# Patient Record
Sex: FEMALE | Race: WHITE | NOT HISPANIC OR LATINO | Employment: OTHER | URBAN - METROPOLITAN AREA
[De-identification: names, ages, dates, MRNs, and addresses within clinical notes are randomized per-mention and may not be internally consistent; named-entity substitution may affect disease eponyms.]

---

## 2023-03-31 ENCOUNTER — NURSING HOME VISIT (OUTPATIENT)
Dept: POST ACUTE CARE | Facility: EXTERNAL LOCATION | Age: 88
End: 2023-03-31
Payer: MEDICARE

## 2023-03-31 VITALS
OXYGEN SATURATION: 94 % | WEIGHT: 152.8 LBS | DIASTOLIC BLOOD PRESSURE: 68 MMHG | BODY MASS INDEX: 28.87 KG/M2 | TEMPERATURE: 97.8 F | RESPIRATION RATE: 18 BRPM | HEART RATE: 72 BPM | SYSTOLIC BLOOD PRESSURE: 112 MMHG

## 2023-03-31 DIAGNOSIS — E03.9 HYPOTHYROIDISM, UNSPECIFIED TYPE: ICD-10-CM

## 2023-03-31 DIAGNOSIS — E11.40 TYPE 2 DIABETES MELLITUS WITH DIABETIC NEUROPATHY, UNSPECIFIED WHETHER LONG TERM INSULIN USE (MULTI): ICD-10-CM

## 2023-03-31 DIAGNOSIS — F32.A DEPRESSION, UNSPECIFIED DEPRESSION TYPE: ICD-10-CM

## 2023-03-31 DIAGNOSIS — N39.0 CHRONIC UTI: ICD-10-CM

## 2023-03-31 DIAGNOSIS — M79.7 FIBROMYALGIA: ICD-10-CM

## 2023-03-31 DIAGNOSIS — G89.29 OTHER CHRONIC PAIN: Primary | ICD-10-CM

## 2023-03-31 DIAGNOSIS — G25.0 BENIGN ESSENTIAL TREMOR: ICD-10-CM

## 2023-03-31 PROBLEM — G25.81 RESTLESS LEG SYNDROME: Status: ACTIVE | Noted: 2023-03-31

## 2023-03-31 PROBLEM — N31.9 NEUROGENIC BLADDER: Status: ACTIVE | Noted: 2023-03-31

## 2023-03-31 PROBLEM — F09 COGNITIVE DYSFUNCTION: Status: ACTIVE | Noted: 2023-03-31

## 2023-03-31 PROBLEM — E11.9 DM2 (DIABETES MELLITUS, TYPE 2) (MULTI): Status: ACTIVE | Noted: 2023-03-31

## 2023-03-31 PROBLEM — S72.90XA FRACTURE, FEMUR (MULTI): Status: RESOLVED | Noted: 2023-03-31 | Resolved: 2023-03-31

## 2023-03-31 PROBLEM — E55.9 VITAMIN D DEFICIENCY: Status: ACTIVE | Noted: 2023-03-31

## 2023-03-31 PROBLEM — H54.40 BLINDNESS OF LEFT EYE: Status: ACTIVE | Noted: 2023-03-31

## 2023-03-31 PROBLEM — R26.89 POOR BALANCE: Status: ACTIVE | Noted: 2023-03-31

## 2023-03-31 PROBLEM — K57.30 DIVERTICULOSIS OF COLON: Status: RESOLVED | Noted: 2023-03-31 | Resolved: 2023-03-31

## 2023-03-31 PROBLEM — G25.9 FUNCTIONAL MOVEMENT DISORDER: Status: ACTIVE | Noted: 2023-03-31

## 2023-03-31 PROBLEM — I63.9 STROKE (MULTI): Status: RESOLVED | Noted: 2023-03-31 | Resolved: 2023-03-31

## 2023-03-31 PROCEDURE — 99310 SBSQ NF CARE HIGH MDM 45: CPT | Performed by: NURSE PRACTITIONER

## 2023-03-31 RX ORDER — BRIMONIDINE TARTRATE 2 MG/ML
1 SOLUTION/ DROPS OPHTHALMIC 2 TIMES DAILY
COMMUNITY
End: 2023-10-17 | Stop reason: WASHOUT

## 2023-03-31 RX ORDER — METFORMIN HYDROCHLORIDE 500 MG/1
500 TABLET ORAL
COMMUNITY

## 2023-03-31 RX ORDER — ESCITALOPRAM OXALATE 20 MG/1
1 TABLET ORAL DAILY
COMMUNITY
Start: 2016-04-05 | End: 2023-10-17 | Stop reason: WASHOUT

## 2023-03-31 RX ORDER — ADHESIVE BANDAGE
30 BANDAGE TOPICAL
COMMUNITY
End: 2023-10-17 | Stop reason: WASHOUT

## 2023-03-31 RX ORDER — MIRABEGRON 50 MG/1
1 TABLET, EXTENDED RELEASE ORAL DAILY
COMMUNITY

## 2023-03-31 RX ORDER — ACETAMINOPHEN 500 MG
50 TABLET ORAL DAILY
COMMUNITY
Start: 2021-05-03

## 2023-03-31 RX ORDER — CHOLECALCIFEROL (VITAMIN D3) 50 MCG
50 TABLET ORAL DAILY
COMMUNITY
End: 2023-10-17 | Stop reason: WASHOUT

## 2023-03-31 RX ORDER — LEVOTHYROXINE SODIUM 75 UG/1
1 TABLET ORAL DAILY
COMMUNITY
Start: 2014-07-17

## 2023-03-31 RX ORDER — NAPROXEN SODIUM 220 MG/1
81 TABLET, FILM COATED ORAL DAILY
COMMUNITY

## 2023-03-31 RX ORDER — ACETAMINOPHEN 325 MG/1
650 TABLET ORAL EVERY 4 HOURS PRN
COMMUNITY
End: 2023-10-17 | Stop reason: WASHOUT

## 2023-03-31 RX ORDER — BIOTIN 1 MG
1 TABLET ORAL DAILY
COMMUNITY
End: 2023-10-17 | Stop reason: WASHOUT

## 2023-03-31 RX ORDER — OXYCODONE AND ACETAMINOPHEN 7.5; 325 MG/1; MG/1
1 TABLET ORAL 2 TIMES DAILY
COMMUNITY
Start: 2022-03-18 | End: 2023-10-17 | Stop reason: WASHOUT

## 2023-03-31 RX ORDER — OXYCODONE AND ACETAMINOPHEN 5; 325 MG/1; MG/1
1 TABLET ORAL EVERY 6 HOURS PRN
COMMUNITY
End: 2023-10-17 | Stop reason: WASHOUT

## 2023-03-31 RX ORDER — BISACODYL 10 MG/1
10 SUPPOSITORY RECTAL ONCE AS NEEDED
COMMUNITY
End: 2023-10-17 | Stop reason: WASHOUT

## 2023-03-31 ASSESSMENT — ENCOUNTER SYMPTOMS
PSYCHIATRIC NEGATIVE: 1
RESPIRATORY NEGATIVE: 1
MUSCULOSKELETAL NEGATIVE: 1
EYES NEGATIVE: 1
TREMORS: 1
GASTROINTESTINAL NEGATIVE: 1
ALLERGIC/IMMUNOLOGIC NEGATIVE: 1
CONSTITUTIONAL NEGATIVE: 1
CARDIOVASCULAR NEGATIVE: 1
ENDOCRINE NEGATIVE: 1
HEMATOLOGIC/LYMPHATIC NEGATIVE: 1

## 2023-03-31 NOTE — LETTER
Patient: Maria Luz Martinez  : 1935    Encounter Date: 2023    MRN:[id number]     Subjective  Patient ID: Maria Luz Martinez is a 87 y.o. female who presents for chronic UTI, Diabetes, Tremors, and Follow up labs.  This is an 86 year old female who is a [facility] resident. Mrs. 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 Resident follows with neurologist (Dr. Ahuja) and pain mgmt () for a previously planned hip replacement that was put on hold last year due to COVID. Mrs. Martinez goes for steroid injections to her hip every 6 months. Resident is following with Dr. Tafoya for tremors and is currently on primidone, tolerating well.  She had a fall 2022 and suffered displaced spiral fracture of the mid to distal diaphysis of the right femur s/p right femur ORIF. She continues on chronic percocet for pain control.     Resident seen today for several concerns that her dtr had communicated via email.  Daughter asking about the chronic reoccurrence of UTI's her mother continues to have and we had discussed a prophylaxis in thepast. Dtr also asking if frequency of blood sugars could be limited to 2-3 tines/week, as well as the possibility of decreasing her ropinerole dose back to 3mg every day which was recommended by the neurologist. Dtr also askinng about recent tsh levels and nursing reports resident needs med refill on pain meds.     Diabetes  Hypoglycemia symptoms include tremors.   Tremor      Review of Systems   Constitutional: Negative.    HENT: Negative.     Eyes: Negative.    Respiratory: Negative.     Cardiovascular: Negative.    Gastrointestinal: Negative.    Endocrine: Negative.    Genitourinary: Negative.    Musculoskeletal: Negative.    Skin: Negative.    Allergic/Immunologic: Negative.    Neurological:  Positive for tremors.   Hematological: Negative.    Psychiatric/Behavioral:  Negative.         Objective    /68   Pulse 72   Temp 36.6 °C (97.8 °F) (Oral)   Resp 18   Wt 69.3 kg (152 lb 12.8 oz)   SpO2 94%   BMI 28.87 kg/m²    Physical Exam  Vitals and nursing note reviewed.   Constitutional:       Appearance: Normal appearance. She is normal weight.   HENT:      Head: Normocephalic and atraumatic.   Cardiovascular:      Rate and Rhythm: Normal rate and regular rhythm.      Pulses: Normal pulses.      Heart sounds: Normal heart sounds.   Pulmonary:      Effort: Pulmonary effort is normal.      Breath sounds: Normal breath sounds.   Abdominal:      General: Abdomen is flat. Bowel sounds are normal.      Palpations: Abdomen is soft.   Musculoskeletal:         General: Normal range of motion.      Cervical back: Normal range of motion and neck supple.   Skin:     General: Skin is warm and dry.      Capillary Refill: Capillary refill takes less than 2 seconds.   Neurological:      General: No focal deficit present.      Mental Status: She is alert and oriented to person, place, and time. Mental status is at baseline.   Psychiatric:         Mood and Affect: Mood normal.         Behavior: Behavior normal.         Thought Content: Thought content normal.         Judgment: Judgment normal.     LABS:  1/4/23  GLYCO-HGBA1C RAFFY  GLYCOHEMOGLOBIN-HGBA1C 6.1 4.1-6.1 %  ~ eAG (Mean Glucose) 128 H  mg/dL  TSH 3-UL 1.259 0.340-5.600 uIU/mL RAFFY  T4, FREE 1.12 0.70-1.48 ng/dL RAFFY        Assessment/Plan  Problem List Items Addressed This Visit          Nervous    Chronic pain - Primary     -Continue current medications as ordered  -Medication refill placed         Benign essential tremor     -Dtr asking to decrease ropiunerole to 3mg (from 4mg)  -This was recommended by her neurologist at last visit to possibly cut down on chronic fatigue  -Will continue to monitor s/s  -Continue primidone            Genitourinary    Chronic UTI     -Dtr and I have discussed the possibility of a prophylaxis  atb as resident has had 4 UTI's in the last 3 months  -Will start cipro 250mg every day for prophylaxis with bacid BID once she receives her steroid injection on 4/7  -Continue to encourage fluids            Musculoskeletal    Fibromyalgia       Endocrine/Metabolic    DM2 (diabetes mellitus, type 2) (CMS/[facility])     -will change her blood sugars to 3x/week on M-W-F only  -Blood sugars have usually been well controlled unless after steroid injections  -Will continue to monitor this         Hypothyroidism     -Daughter asking about results of recent labs  -Reviewed last 2 results which have been well controlled  -Will continue to monitor [id number]             Other    Depression     -continue cymbalta as ordered  -Monitor mood  -Encourage to participate in activities             Discussed with Daughter regarding all of her concerns and questions  Total time spent : 1440pm-1545pm      Electronically Signed By: CAITY Thakur   4/2/23  6:19 PM

## 2023-03-31 NOTE — PROGRESS NOTES
MRN:[id number]     Subjective   Patient ID: Maria Luz Martinez is a 87 y.o. female who presents for chronic UTI, Diabetes, Tremors, and Follow up labs.  This is an 86 year old female who is a [facility] resident. Mrs. 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 Resident follows with neurologist (Dr. Ahuja) and pain mgmt () for a previously planned hip replacement that was put on hold last year due to COVID. Mrs. Martinez goes for steroid injections to her hip every 6 months. Resident is following with Dr. Tafoya for tremors and is currently on primidone, tolerating well.  She had a fall Feb 2022 and suffered displaced spiral fracture of the mid to distal diaphysis of the right femur s/p right femur ORIF. She continues on chronic percocet for pain control.     Resident seen today for several concerns that her dtr had communicated via email.  Daughter asking about the chronic reoccurrence of UTI's her mother continues to have and we had discussed a prophylaxis in thepast. Dtr also asking if frequency of blood sugars could be limited to 2-3 tines/week, as well as the possibility of decreasing her ropinerole dose back to 3mg every day which was recommended by the neurologist. Dtr also askinng about recent tsh levels and nursing reports resident needs med refill on pain meds.     Diabetes  Hypoglycemia symptoms include tremors.   Tremor      Review of Systems   Constitutional: Negative.    HENT: Negative.     Eyes: Negative.    Respiratory: Negative.     Cardiovascular: Negative.    Gastrointestinal: Negative.    Endocrine: Negative.    Genitourinary: Negative.    Musculoskeletal: Negative.    Skin: Negative.    Allergic/Immunologic: Negative.    Neurological:  Positive for tremors.   Hematological: Negative.    Psychiatric/Behavioral: Negative.         Objective     /68   Pulse 72   Temp 36.6 °C (97.8  °F) (Oral)   Resp 18   Wt 69.3 kg (152 lb 12.8 oz)   SpO2 94%   BMI 28.87 kg/m²    Physical Exam  Vitals and nursing note reviewed.   Constitutional:       Appearance: Normal appearance. She is normal weight.   HENT:      Head: Normocephalic and atraumatic.   Cardiovascular:      Rate and Rhythm: Normal rate and regular rhythm.      Pulses: Normal pulses.      Heart sounds: Normal heart sounds.   Pulmonary:      Effort: Pulmonary effort is normal.      Breath sounds: Normal breath sounds.   Abdominal:      General: Abdomen is flat. Bowel sounds are normal.      Palpations: Abdomen is soft.   Musculoskeletal:         General: Normal range of motion.      Cervical back: Normal range of motion and neck supple.   Skin:     General: Skin is warm and dry.      Capillary Refill: Capillary refill takes less than 2 seconds.   Neurological:      General: No focal deficit present.      Mental Status: She is alert and oriented to person, place, and time. Mental status is at baseline.   Psychiatric:         Mood and Affect: Mood normal.         Behavior: Behavior normal.         Thought Content: Thought content normal.         Judgment: Judgment normal.     LABS:  1/4/23  GLYCO-HGBA1C RAFFY  GLYCOHEMOGLOBIN-HGBA1C 6.1 4.1-6.1 %  ~ eAG (Mean Glucose) 128 H  mg/dL  TSH 3-UL 1.259 0.340-5.600 uIU/mL RAFFY  T4, FREE 1.12 0.70-1.48 ng/dL [facility]        Assessment/Plan   Problem List Items Addressed This Visit          Nervous    Chronic pain - Primary     -Continue current medications as ordered  -Medication refill placed         Benign essential tremor     -Dtr asking to decrease ropiunerole to 3mg (from 4mg)  -This was recommended by her neurologist at last visit to possibly cut down on chronic fatigue  -Will continue to monitor s/s  -Continue primidone            Genitourinary    Chronic UTI     -Dtr and I have discussed the possibility of a prophylaxis atb as resident has had 4 UTI's in the last 3 months  -Will start cipro  250mg every day for prophylaxis with bacid BID once she receives her steroid injection on 4/7  -Continue to encourage fluids            Musculoskeletal    Fibromyalgia       Endocrine/Metabolic    DM2 (diabetes mellitus, type 2) (CMS/HCC)     -will change her blood sugars to 3x/week on M-W-F only  -Blood sugars have usually been well controlled unless after steroid injections  -Will continue to monitor this         Hypothyroidism     -Daughter asking about results of recent labs  -Reviewed last 2 results which have been well controlled  -Will continue to monitor [id number]             Other    Depression     -continue cymbalta as ordered  -Monitor mood  -Encourage to participate in activities             Discussed with Daughter regarding all of her concerns and questions  Total time spent : 1440pm-1545pm

## 2023-04-02 PROBLEM — F32.A DEPRESSION: Status: ACTIVE | Noted: 2023-04-02

## 2023-04-02 PROBLEM — N39.0 CHRONIC UTI: Status: ACTIVE | Noted: 2023-04-02

## 2023-04-02 PROBLEM — G25.0 BENIGN ESSENTIAL TREMOR: Status: ACTIVE | Noted: 2023-04-02

## 2023-04-02 NOTE — ASSESSMENT & PLAN NOTE
-will change her blood sugars to 3x/week on M-W-F only  -Blood sugars have usually been well controlled unless after steroid injections  -Will continue to monitor this

## 2023-04-02 NOTE — ASSESSMENT & PLAN NOTE
-Dtr asking to decrease ropiunerole to 3mg (from 4mg)  -This was recommended by her neurologist at last visit to possibly cut down on chronic fatigue  -Will continue to monitor s/s  -Continue primidone

## 2023-04-02 NOTE — ASSESSMENT & PLAN NOTE
-Daughter asking about results of recent labs  -Reviewed last 2 results which have been well controlled  -Will continue to monitor [id number]

## 2023-04-02 NOTE — ASSESSMENT & PLAN NOTE
-Dtr and I have discussed the possibility of a prophylaxis atb as resident has had 4 UTI's in the last 3 months  -Will start cipro 250mg every day for prophylaxis with bacid BID once she receives her steroid injection on 4/7  -Continue to encourage fluids

## 2023-06-23 ENCOUNTER — NURSING HOME VISIT (OUTPATIENT)
Dept: POST ACUTE CARE | Facility: EXTERNAL LOCATION | Age: 88
End: 2023-06-23
Payer: MEDICARE

## 2023-06-23 DIAGNOSIS — G25.81 RESTLESS LEG SYNDROME: ICD-10-CM

## 2023-06-23 DIAGNOSIS — F01.518 VASCULAR DEMENTIA WITH BEHAVIORAL DISTURBANCE (MULTI): Primary | ICD-10-CM

## 2023-06-23 DIAGNOSIS — G89.4 CHRONIC PAIN SYNDROME: ICD-10-CM

## 2023-06-23 DIAGNOSIS — F32.A DEPRESSION, UNSPECIFIED DEPRESSION TYPE: ICD-10-CM

## 2023-06-23 DIAGNOSIS — G25.0 BENIGN ESSENTIAL TREMOR: ICD-10-CM

## 2023-06-23 PROCEDURE — 99309 SBSQ NF CARE MODERATE MDM 30: CPT | Performed by: NURSE PRACTITIONER

## 2023-06-23 NOTE — LETTER
Patient: Maria Luz Martinez  : 1935    Encounter Date: 2023    MRN:[id number]     Subjective   Patient ID: Maria Luz Martinez is a 87 y.o. female who presents for Depression (Resident seen for routine follow up of depression. Nursing reports, overall resident doing well. Has been coming out of her room for frequent;y to visit her [place]and in [place] and for many of the activities. Mood has been positive. Resident continues to go out to see the eye doctor for ongoing MD/glaucoma. ).  This is an 87 year old female who is a [facility] resident. Mrs. 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 Resident follows with neurologist (Dr. Ahuja) and pain mgmt () for a previously planned hip replacement that was put on hold last year due to COVID. Mrs. Martinez goes for steroid injections to her hip every 6 months. Resident is following with Dr. Tafoya for tremors and is currently on primidone, tolerating well.    DepressionPatient presents with the following symptoms: nervousness/anxiety.          Review of Systems   Constitutional: Negative.    HENT: Negative.     Eyes: Negative.    Respiratory: Negative.     Cardiovascular: Negative.    Gastrointestinal: Negative.    Endocrine: Negative.    Genitourinary: Negative.  Negative for dysuria and frequency.   Musculoskeletal: Negative.    Skin: Negative.    Neurological: Negative.    Psychiatric/Behavioral:  Positive for depression. The patient is nervous/anxious.        Objective     /69   Pulse 77   Temp 36.6 °C (97.9 °F)   Resp 18   Wt 70.7 kg (155 lb 14.4 oz)   SpO2 95%   BMI 29.46 kg/m²    Physical Exam  Vitals and nursing note reviewed.   Constitutional:       Appearance: Normal appearance.   HENT:      Head: Normocephalic.      Nose: Nose normal.      Mouth/Throat:      Mouth: Mucous membranes are moist.      Pharynx: Oropharynx is clear.   Eyes:       Conjunctiva/sclera: Conjunctivae normal.      Comments: Blind      Cardiovascular:      Rate and Rhythm: Normal rate and regular rhythm.      Pulses: Normal pulses.      Heart sounds: Normal heart sounds.   Pulmonary:      Effort: Pulmonary effort is normal. No respiratory distress.      Breath sounds: Normal breath sounds.   Abdominal:      General: Abdomen is flat. Bowel sounds are normal.      Palpations: Abdomen is soft.   Musculoskeletal:         General: Normal range of motion.      Cervical back: Normal range of motion.   Skin:     General: Skin is warm.      Capillary Refill: Capillary refill takes 2 to 3 seconds.   Neurological:      General: No focal deficit present.      Mental Status: She is alert. Mental status is at baseline.   Psychiatric:         Mood and Affect: Mood normal.       Assessment/Plan   Problem List Items Addressed This Visit       Restless leg syndrome     Continues on gabapentin as ordered  No recent complaints  Monitor for changes         Chronic pain     Resident remains on pain medications  See Pain mgmt who is aware we write for her medications  Cont to monitor for increased pain         Benign essential tremor    Depression     Continue duloxetine as ordered  Monitor nmood  Encourage to come out during the day         Vascular dementia with behavioral disturbance (CMS/HCC) - Primary     Monitor for changes in mood/personality                     Electronically Signed By: CAITY Thakur   6/29/23  8:27 PM

## 2023-06-29 VITALS
OXYGEN SATURATION: 95 % | BODY MASS INDEX: 29.46 KG/M2 | WEIGHT: 155.9 LBS | DIASTOLIC BLOOD PRESSURE: 69 MMHG | HEART RATE: 77 BPM | RESPIRATION RATE: 18 BRPM | SYSTOLIC BLOOD PRESSURE: 122 MMHG | TEMPERATURE: 97.9 F

## 2023-06-29 PROBLEM — F01.518 VASCULAR DEMENTIA WITH BEHAVIORAL DISTURBANCE (MULTI): Status: ACTIVE | Noted: 2023-06-29

## 2023-06-29 ASSESSMENT — ENCOUNTER SYMPTOMS
GASTROINTESTINAL NEGATIVE: 1
NEUROLOGICAL NEGATIVE: 1
CONSTITUTIONAL NEGATIVE: 1
FREQUENCY: 0
DYSURIA: 0
RESPIRATORY NEGATIVE: 1
NERVOUS/ANXIOUS: 1
ENDOCRINE NEGATIVE: 1
CARDIOVASCULAR NEGATIVE: 1
EYES NEGATIVE: 1
DEPRESSION: 1
MUSCULOSKELETAL NEGATIVE: 1

## 2023-06-29 NOTE — PROGRESS NOTES
MRN:[id number]     Subjective   Patient ID: Maria Luz Martinez is a 87 y.o. female who presents for Depression (Resident seen for routine follow up of depression. Nursing reports, overall resident doing well. Has been coming out of her room for frequent;y to visit her [place]and in [place] and for many of the activities. Mood has been positive. Resident continues to go out to see the eye doctor for ongoing MD/glaucoma. ).  This is an 87 year old female who is a [facility] resident. Mrs. 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 Resident follows with neurologist (Dr. Ahuja) and pain mgmt () for a previously planned hip replacement that was put on hold last year due to COVID. Mrs. Martinez goes for steroid injections to her hip every 6 months. Resident is following with Dr. Tafoya for tremors and is currently on primidone, tolerating well.    DepressionPatient presents with the following symptoms: nervousness/anxiety.          Review of Systems   Constitutional: Negative.    HENT: Negative.     Eyes: Negative.    Respiratory: Negative.     Cardiovascular: Negative.    Gastrointestinal: Negative.    Endocrine: Negative.    Genitourinary: Negative.  Negative for dysuria and frequency.   Musculoskeletal: Negative.    Skin: Negative.    Neurological: Negative.    Psychiatric/Behavioral:  Positive for depression. The patient is nervous/anxious.        Objective     /69   Pulse 77   Temp 36.6 °C (97.9 °F)   Resp 18   Wt 70.7 kg (155 lb 14.4 oz)   SpO2 95%   BMI 29.46 kg/m²    Physical Exam  Vitals and nursing note reviewed.   Constitutional:       Appearance: Normal appearance.   HENT:      Head: Normocephalic.      Nose: Nose normal.      Mouth/Throat:      Mouth: Mucous membranes are moist.      Pharynx: Oropharynx is clear.   Eyes:      Conjunctiva/sclera: Conjunctivae normal.      Comments: Blind       Cardiovascular:      Rate and Rhythm: Normal rate and regular rhythm.      Pulses: Normal pulses.      Heart sounds: Normal heart sounds.   Pulmonary:      Effort: Pulmonary effort is normal. No respiratory distress.      Breath sounds: Normal breath sounds.   Abdominal:      General: Abdomen is flat. Bowel sounds are normal.      Palpations: Abdomen is soft.   Musculoskeletal:         General: Normal range of motion.      Cervical back: Normal range of motion.   Skin:     General: Skin is warm.      Capillary Refill: Capillary refill takes 2 to 3 seconds.   Neurological:      General: No focal deficit present.      Mental Status: She is alert. Mental status is at baseline.   Psychiatric:         Mood and Affect: Mood normal.       Assessment/Plan   Problem List Items Addressed This Visit       Restless leg syndrome     Continues on gabapentin as ordered  No recent complaints  Monitor for changes         Chronic pain     Resident remains on pain medications  See Pain mgmt who is aware we write for her medications  Cont to monitor for increased pain         Benign essential tremor    Depression     Continue duloxetine as ordered  Monitor nmood  Encourage to come out during the day         Vascular dementia with behavioral disturbance (CMS/HCC) - Primary     Monitor for changes in mood/personality

## 2023-06-30 NOTE — ASSESSMENT & PLAN NOTE
Resident remains on pain medications  See Pain mgmt who is aware we write for her medications  Cont to monitor for increased pain

## 2023-07-12 ENCOUNTER — NURSING HOME VISIT (OUTPATIENT)
Dept: POST ACUTE CARE | Facility: EXTERNAL LOCATION | Age: 88
End: 2023-07-12
Payer: MEDICARE

## 2023-07-12 VITALS
DIASTOLIC BLOOD PRESSURE: 64 MMHG | RESPIRATION RATE: 18 BRPM | BODY MASS INDEX: 29.49 KG/M2 | WEIGHT: 156.1 LBS | SYSTOLIC BLOOD PRESSURE: 124 MMHG | OXYGEN SATURATION: 95 % | TEMPERATURE: 97.6 F | HEART RATE: 74 BPM

## 2023-07-12 DIAGNOSIS — F32.A DEPRESSION, UNSPECIFIED DEPRESSION TYPE: ICD-10-CM

## 2023-07-12 DIAGNOSIS — M79.7 FIBROMYALGIA: ICD-10-CM

## 2023-07-12 DIAGNOSIS — E11.40 TYPE 2 DIABETES MELLITUS WITH DIABETIC NEUROPATHY, UNSPECIFIED WHETHER LONG TERM INSULIN USE (MULTI): ICD-10-CM

## 2023-07-12 DIAGNOSIS — G25.81 RESTLESS LEG SYNDROME: ICD-10-CM

## 2023-07-12 DIAGNOSIS — E03.9 HYPOTHYROIDISM, UNSPECIFIED TYPE: ICD-10-CM

## 2023-07-12 DIAGNOSIS — E55.9 VITAMIN D DEFICIENCY: ICD-10-CM

## 2023-07-12 DIAGNOSIS — N39.0 CHRONIC UTI: Primary | ICD-10-CM

## 2023-07-12 DIAGNOSIS — G25.0 BENIGN ESSENTIAL TREMOR: ICD-10-CM

## 2023-07-12 PROCEDURE — 99310 SBSQ NF CARE HIGH MDM 45: CPT | Performed by: NURSE PRACTITIONER

## 2023-07-12 NOTE — PROGRESS NOTES
"MRN:[id number]     Subjective   Patient ID: Maria Luz Martinez is a 87 y.o. female who presents for follow up UTI (Resident seen for routine follow up of UTI. Resident recently finished a course of ATB on 7/10/23. She remains on prophylactic Cipro as requested per family. Resident denies s/s. Reports \"feeling better.\" Nursing denies new concerns. ).  This is an 87 year old female who is a [facility] resident. Mrs. 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 Resident follows with neurologist (Dr. Ahuja) and pain mgmt () for a previously planned hip replacement that was put on hold last year due to COVID. Mrs. Martinez goes for steroid injections to her hip every 6 months. Resident is following with Dr. Tafoya for tremors and is currently on primidone.         Review of Systems   Constitutional: Negative.    HENT:  Negative for postnasal drip, rhinorrhea and sore throat.    Eyes: Negative.    Respiratory:  Negative for cough, shortness of breath and wheezing.    Cardiovascular:  Negative for chest pain and palpitations.   Gastrointestinal:  Negative for constipation, diarrhea and nausea.   Genitourinary:  Negative for dysuria, frequency and urgency.   Musculoskeletal: Negative.    Skin: Negative.    Neurological:  Positive for tremors and weakness. Negative for dizziness, syncope and light-headedness.   Psychiatric/Behavioral:  Negative for confusion and sleep disturbance. The patient is nervous/anxious.        Objective     /64   Pulse 74   Temp 36.4 °C (97.6 °F)   Resp 18   Wt 70.8 kg (156 lb 1.6 oz)   SpO2 95%   BMI 29.49 kg/m²    Physical Exam  Vitals and nursing note reviewed.   Constitutional:       General: She is not in acute distress.     Appearance: Normal appearance. She is normal weight.   HENT:      Head: Normocephalic.      Mouth/Throat:      Mouth: Mucous membranes are moist.      " Pharynx: Oropharynx is clear.   Eyes:      Pupils: Pupils are equal, round, and reactive to light.   Cardiovascular:      Rate and Rhythm: Normal rate and regular rhythm.      Pulses: Normal pulses.      Heart sounds: Normal heart sounds.   Pulmonary:      Effort: Pulmonary effort is normal. No respiratory distress.      Breath sounds: Normal breath sounds.   Abdominal:      General: Abdomen is flat. Bowel sounds are normal. There is no distension.      Palpations: Abdomen is soft.   Musculoskeletal:         General: No swelling.      Cervical back: Normal range of motion.   Skin:     General: Skin is warm and dry.      Capillary Refill: Capillary refill takes 2 to 3 seconds.   Neurological:      General: No focal deficit present.      Mental Status: She is alert. Mental status is at baseline.   Psychiatric:         Mood and Affect: Mood normal.         Behavior: Behavior normal.         Thought Content: Thought content normal.         Judgment: Judgment normal.       LABS:  7/5/23:  GLYCO-HGBA1C RAFFY  GLYCOHEMOGLOBIN-HGBA1C 5.8 4.1-6.1 %  eAG (Mean Glucose) 120  mg/dL  TSH 3-UL 1.392 0.340-5.500 uIU/mL RAFFY  T4, FREE 0.93 0.6-1.7 ng/dL    6/28/23:  Organisms Growth  [1] ESCHERICHIA COLI >100,000 CFU/mL  Antibiotic Sensitivity  Organism # [1]  AMIKACIN S [<=16]  AMP/SULBACTAM R [>16/8]  AMPICILLIN R [>16]  AUGMENTIN S [<=8/4]  BACTRIM (TRIMETH/SULFA) S [<=0.5/9.5]  CEFAZOLIN S [<=2]  CEFTRIAXONE S [<=1]  CEFUROXIME S [<=4]  CIPROFLOXICIN R [>2]  GENTAMICIN R [>8]  LEVOFLOXACIN R [>4]  NITROFURANTOIN S [<=32]  PIPERACILLIN/TAZOBACTAM S [<=8]  TETRACYCLINE S [<=4]  TOBRAMYCIN I [8]  URINALYSIS RAFFY   Grading for Epith Cells,Bact,Cast   NEGATIVE = 0 - 2   FEW = 3 - 5   MODERATE = 6 - 20   MANY = 21 - 50   TNTC = >50  ~ COLOR JOSHUA ABN YELLOW  ~ CLARITY HAZY ABN CLEAR  GLUCOSE,UR NEGATIVE NEGATIVE  BILIRUBIN,UR NEGATIVE NEGATIVE  KETONES,UR NEGATIVE NEGATIVE  SPECIFIC GRAVITY 1.030 1.001-1.030  BLOOD,UR NEGATIVE  NEGATIVE  PH, URINE 5.0 5.0-8.5       Assessment/Plan   Problem List Items Addressed This Visit       DM2 (diabetes mellitus, type 2) (CMS/[facility])     Blood sugars remain very stable  Last A1C 5.8  Remains on only Metformin bid         Fibromyalgia    Hypothyroidism     Continue synthroid as ordered  Will continue to monitor labs and adjust prn         Restless leg syndrome     Continue ropinerole as ordered  No reports of increase/change in s/s  Monitor         Vitamin D deficiency     Continue vitamin D daily  Yearly labs to check vitamin D level           Chronic UTI - Primary     Resident just finishing a course of atb for UTI  Back on prophylaxis per family request once finished  Will continue to monitor for s/s  Denies burning, pressure or frequency currently         Benign essential tremor     Remains on the primidone as ordered per neurologist  Will continue to monitor this  Continues to have the tremor with movement/rest         Depression     Continue current dose of cymbalta  Mood seems to be stable  Coming out of her room more  Will continue to monitor                 [unfilled]

## 2023-07-12 NOTE — LETTER
"Patient: Maria Luz Martinez  : 1935    Encounter Date: 2023    MRN:[id number]     Subjective  Patient ID: Maria Luz Martinez is a 87 y.o. female who presents for follow up UTI (Resident seen for routine follow up of UTI. Resident recently finished a course of ATB on 7/10/23. She remains on prophylactic Cipro as requested per family. Resident denies s/s. Reports \"feeling better.\" Nursing denies new concerns. ).  This is an 87 year old female who is a [facility] resident. Mrs. 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 Resident follows with neurologist (Dr. Ahuja) and pain mgmt () for a previously planned hip replacement that was put on hold last year due to COVID. Mrs. Martinez goes for steroid injections to her hip every 6 months. Resident is following with Dr. Tafoya for tremors and is currently on primidone.         Review of Systems   Constitutional: Negative.    HENT:  Negative for postnasal drip, rhinorrhea and sore throat.    Eyes: Negative.    Respiratory:  Negative for cough, shortness of breath and wheezing.    Cardiovascular:  Negative for chest pain and palpitations.   Gastrointestinal:  Negative for constipation, diarrhea and nausea.   Genitourinary:  Negative for dysuria, frequency and urgency.   Musculoskeletal: Negative.    Skin: Negative.    Neurological:  Positive for tremors and weakness. Negative for dizziness, syncope and light-headedness.   Psychiatric/Behavioral:  Negative for confusion and sleep disturbance. The patient is nervous/anxious.        Objective    /64   Pulse 74   Temp 36.4 °C (97.6 °F)   Resp 18   Wt 70.8 kg (156 lb 1.6 oz)   SpO2 95%   BMI 29.49 kg/m²    Physical Exam  Vitals and nursing note reviewed.   Constitutional:       General: She is not in acute distress.     Appearance: Normal appearance. She is normal weight.   HENT:      Head: " Normocephalic.      Mouth/Throat:      Mouth: Mucous membranes are moist.      Pharynx: Oropharynx is clear.   Eyes:      Pupils: Pupils are equal, round, and reactive to light.   Cardiovascular:      Rate and Rhythm: Normal rate and regular rhythm.      Pulses: Normal pulses.      Heart sounds: Normal heart sounds.   Pulmonary:      Effort: Pulmonary effort is normal. No respiratory distress.      Breath sounds: Normal breath sounds.   Abdominal:      General: Abdomen is flat. Bowel sounds are normal. There is no distension.      Palpations: Abdomen is soft.   Musculoskeletal:         General: No swelling.      Cervical back: Normal range of motion.   Skin:     General: Skin is warm and dry.      Capillary Refill: Capillary refill takes 2 to 3 seconds.   Neurological:      General: No focal deficit present.      Mental Status: She is alert. Mental status is at baseline.   Psychiatric:         Mood and Affect: Mood normal.         Behavior: Behavior normal.         Thought Content: Thought content normal.         Judgment: Judgment normal.       LABS:  7/5/23:  GLYCO-HGBA1C RAFFY  GLYCOHEMOGLOBIN-HGBA1C 5.8 4.1-6.1 %  eAG (Mean Glucose) 120  mg/dL  TSH 3-UL 1.392 0.340-5.500 uIU/mL RAFFY  T4, FREE 0.93 0.6-1.7 ng/dL    6/28/23:  Organisms Growth  [1] ESCHERICHIA COLI >100,000 CFU/mL  Antibiotic Sensitivity  Organism # [1]  AMIKACIN S [<=16]  AMP/SULBACTAM R [>16/8]  AMPICILLIN R [>16]  AUGMENTIN S [<=8/4]  BACTRIM (TRIMETH/SULFA) S [<=0.5/9.5]  CEFAZOLIN S [<=2]  CEFTRIAXONE S [<=1]  CEFUROXIME S [<=4]  CIPROFLOXICIN R [>2]  GENTAMICIN R [>8]  LEVOFLOXACIN R [>4]  NITROFURANTOIN S [<=32]  PIPERACILLIN/TAZOBACTAM S [<=8]  TETRACYCLINE S [<=4]  TOBRAMYCIN I [8]  URINALYSIS RAFFY   Grading for Epith Cells,Bact,Cast   NEGATIVE = 0 - 2   FEW = 3 - 5   MODERATE = 6 - 20   MANY = 21 - 50   TNTC = >50  ~ COLOR JOSHUA ABN YELLOW  ~ CLARITY HAZY ABN CLEAR  GLUCOSE,UR NEGATIVE NEGATIVE  BILIRUBIN,UR NEGATIVE  NEGATIVE  KETONES,UR NEGATIVE NEGATIVE  SPECIFIC GRAVITY 1.030 1.001-1.030  BLOOD,UR NEGATIVE NEGATIVE  PH, URINE 5.0 5.0-8.5       Assessment/Plan  Problem List Items Addressed This Visit       DM2 (diabetes mellitus, type 2) (CMS/[facility])     Blood sugars remain very stable  Last A1C 5.8  Remains on only Metformin bid         Fibromyalgia    Hypothyroidism     Continue synthroid as ordered  Will continue to monitor labs and adjust prn         Restless leg syndrome     Continue ropinerole as ordered  No reports of increase/change in s/s  Monitor         Vitamin D deficiency     Continue vitamin D daily  Yearly labs to check vitamin D level           Chronic UTI - Primary     Resident just finishing a course of atb for UTI  Back on prophylaxis per family request once finished  Will continue to monitor for s/s  Denies burning, pressure or frequency currently         Benign essential tremor     Remains on the primidone as ordered per neurologist  Will continue to monitor this  Continues to have the tremor with movement/rest         Depression     Continue current dose of cymbalta  Mood seems to be stable  Coming out of her room more  Will continue to monitor                 [unfilled]      Electronically Signed By: CAITY Thakur   7/13/23  7:11 PM

## 2023-07-13 ASSESSMENT — ENCOUNTER SYMPTOMS
SHORTNESS OF BREATH: 0
SORE THROAT: 0
NAUSEA: 0
FREQUENCY: 0
DYSURIA: 0
WHEEZING: 0
NERVOUS/ANXIOUS: 1
LIGHT-HEADEDNESS: 0
CONSTITUTIONAL NEGATIVE: 1
DIARRHEA: 0
SLEEP DISTURBANCE: 0
CONFUSION: 0
EYES NEGATIVE: 1
DIZZINESS: 0
RHINORRHEA: 0
COUGH: 0
PALPITATIONS: 0
CONSTIPATION: 0
MUSCULOSKELETAL NEGATIVE: 1
WEAKNESS: 1
TREMORS: 1

## 2023-07-13 NOTE — ASSESSMENT & PLAN NOTE
Remains on the primidone as ordered per neurologist  Will continue to monitor this  Continues to have the tremor with movement/rest

## 2023-07-13 NOTE — ASSESSMENT & PLAN NOTE
Continue current dose of cymbalta  Mood seems to be stable  Coming out of her room more  Will continue to monitor

## 2023-07-13 NOTE — ASSESSMENT & PLAN NOTE
Resident just finishing a course of atb for UTI  Back on prophylaxis per family request once finished  Will continue to monitor for s/s  Denies burning, pressure or frequency currently

## 2023-08-14 ENCOUNTER — NURSING HOME VISIT (OUTPATIENT)
Dept: POST ACUTE CARE | Facility: EXTERNAL LOCATION | Age: 88
End: 2023-08-14
Payer: MEDICARE

## 2023-08-14 DIAGNOSIS — G25.0 BENIGN ESSENTIAL TREMOR: ICD-10-CM

## 2023-08-14 DIAGNOSIS — G89.4 CHRONIC PAIN SYNDROME: ICD-10-CM

## 2023-08-14 DIAGNOSIS — G25.81 RESTLESS LEG SYNDROME: ICD-10-CM

## 2023-08-14 DIAGNOSIS — F01.518 VASCULAR DEMENTIA WITH BEHAVIORAL DISTURBANCE (MULTI): ICD-10-CM

## 2023-08-14 DIAGNOSIS — G62.9 NEUROPATHY: Primary | ICD-10-CM

## 2023-08-14 PROCEDURE — 99310 SBSQ NF CARE HIGH MDM 45: CPT | Performed by: NURSE PRACTITIONER

## 2023-08-14 NOTE — LETTER
"Patient: Maria Luz Martinez  : 1935    Encounter Date: 2023    MRN:[id number]     Subjective  Patient ID: Maria Luz Martinez is a 87 y.o. female who presents for Peripheral Neuropathy (Resident seen for routine follow up of peripheral neuropathy. Remains on gabapentin daily. Currently no complaints other than chronic pain \"all over, especially in my back\". Reports pain medication helps. Nursing reports no new concerns. ).  This is an 87 year old female who is a LTC resident. Mrs. 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 Resident follows with neurologist (Dr. Ahuja) and pain mgmt () for a previously planned hip replacement that was put on hold last year due to COVID. Mrs. Martinez goes for steroid injections to her hip every 6 months. Resident is following with Dr. Tafoya for tremors and is currently on primidone.       Neuropathy    The onset of symptoms is unknown. It is located in the LLE and RLE region. The distribution is symmetrical.       Review of Systems   Constitutional: Negative.    HENT: Negative.     Eyes: Negative.    Respiratory: Negative.     Cardiovascular: Negative.    Gastrointestinal: Negative.  Negative for constipation and nausea.   Endocrine: Negative.    Genitourinary: Negative.    Musculoskeletal:  Positive for back pain.   Skin: Negative.    Neurological: Negative.    Psychiatric/Behavioral: Negative.  Negative for sleep disturbance. The patient is not hyperactive.    All other systems reviewed and are negative.      Objective    /73   Pulse 73   Temp 36.7 °C (98.1 °F)   Resp 19   Wt 70.7 kg (155 lb 14.4 oz)   SpO2 95%   BMI 29.46 kg/m²    Physical Exam  Constitutional:       General: She is not in acute distress.  HENT:      Head: Normocephalic.      Nose: Nose normal.      Mouth/Throat:      Mouth: Mucous membranes are moist.      Pharynx: Oropharynx " is clear.   Eyes:      Pupils: Pupils are equal, round, and reactive to light.   Cardiovascular:      Rate and Rhythm: Normal rate and regular rhythm.      Pulses: Normal pulses.      Heart sounds: Normal heart sounds.   Pulmonary:      Effort: Pulmonary effort is normal. No respiratory distress.      Breath sounds: Normal breath sounds. No wheezing.   Abdominal:      General: Abdomen is flat.      Palpations: Abdomen is soft.   Musculoskeletal:         General: Normal range of motion.      Cervical back: Normal range of motion.      Right lower leg: No edema.      Left lower leg: No edema.   Skin:     General: Skin is warm and dry.      Capillary Refill: Capillary refill takes 2 to 3 seconds.   Neurological:      General: No focal deficit present.      Mental Status: She is alert. Mental status is at baseline.   Psychiatric:         Mood and Affect: Mood normal.         Behavior: Behavior normal.       LABS  8/7/23  BASIC MET PNL INCL GFR (BMP) RAFFY  ~ GLUCOSE 137 H mg/dL  GLUCOSE, FASTING 65-99 mg/dL  GLUCOSE, NON-FASTING  mg/dL  SODIUM 140 136-145 mEq/L  POTASSIUM 4.0 3.5-5.3 mEq/L  CHLORIDE 101  mEq/L  CARBON DIOXIDE (CO2) 25 21-33 mEq/L  BUN (UREA NITROGEN) 10 7-25 mg/dL  CREATININE 0.6 0.6-1.2 mg/dL  BUN/CREATININE RATIO 17 6-22  GFR- 114 >60 mL/min/1.73 m2  GFR-NON-AFRICAN AMERICAN 95 >60 mL/min/1.73 m2   Stage of CKD eGFR (mL/min/1.73 square meters)   Stage 1 >/= 90 or > 90   Stage 2 60 - 89   Stage 3 30 - 59   Stage 4 15 - 29   Stage 5 </= 14 or < 15  ** GFR is reliable for adults 17 to 69 years with stable kidney   function.  CALCIUM 9.1 8.4-10.2 mg/dL  CBC W/DIFF RAFFY  WBC 4.5 4.5-10.8 K/cmm  RBC 4.26 3.90-5.40 M/cmm  HEMOGLOBIN 13.1 12.0-16.0 g/dL  HEMATOCRIT 40.5 36.0-48.0 %  MCV 95.1 80.0-100.0 fL  MCH 30.8 26.0-35.0 pg  MCHC 32.4 31.0-36.5 g/dL  RDW 14.3 11.0-16.0 %  PLATELET 191 150-450 K/cmm  MPV 7.7 6.5-12.0 fL  NEUTROPHILS 50.1 40.0-80.0 %  LYMPHS 37.2 13.0-48.0  %  MONOCYTES 9.6 2.0-12.0 %  EOS 2.4 0.0-8.0 %  BASO 0.7 0.0-2.0 %  NEUTS (ABSOLUTE) 2.20 1.50-7.60 K/uL    Assessment/Plan  Problem List Items Addressed This Visit       Restless leg syndrome     Takes ropinerole  Denies break thru s/s           Chronic pain     Sees pain mgmt  No changes in meds  Pain meds still effective  monitor         Benign essential tremor     Has been on primidone  Sees neurology  No changes in s/s         Vascular dementia with behavioral disturbance (CMS/HCC)     Continue cymbalta  Uses vistaril prn for anxiety which helps  Monitor for anxiety           Neuropathy - Primary     Continues on gabapentin  Reports no break thru  Continue to monitor                     Electronically Signed By: CAITY Thakur   8/20/23  6:29 PM

## 2023-08-20 VITALS
BODY MASS INDEX: 29.46 KG/M2 | DIASTOLIC BLOOD PRESSURE: 73 MMHG | HEART RATE: 73 BPM | OXYGEN SATURATION: 95 % | SYSTOLIC BLOOD PRESSURE: 122 MMHG | RESPIRATION RATE: 19 BRPM | WEIGHT: 155.9 LBS | TEMPERATURE: 98.1 F

## 2023-08-20 PROBLEM — G62.9 NEUROPATHY: Status: ACTIVE | Noted: 2023-08-20

## 2023-08-20 ASSESSMENT — ENCOUNTER SYMPTOMS
RESPIRATORY NEGATIVE: 1
EYES NEGATIVE: 1
NEUROLOGICAL NEGATIVE: 1
ENDOCRINE NEGATIVE: 1
SLEEP DISTURBANCE: 0
GASTROINTESTINAL NEGATIVE: 1
NAUSEA: 0
CARDIOVASCULAR NEGATIVE: 1
CONSTITUTIONAL NEGATIVE: 1
HYPERACTIVE: 0
CONSTIPATION: 0
PSYCHIATRIC NEGATIVE: 1
BACK PAIN: 1

## 2023-08-20 NOTE — PROGRESS NOTES
"MRN:[id number]     Subjective   Patient ID: Maria Luz Martinez is a 87 y.o. female who presents for Peripheral Neuropathy (Resident seen for routine follow up of peripheral neuropathy. Remains on gabapentin daily. Currently no complaints other than chronic pain \"all over, especially in my back\". Reports pain medication helps. Nursing reports no new concerns. ).  This is an 87 year old female who is a LTC resident. Mrs. 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 Resident follows with neurologist (Dr. Ahuja) and pain mgmt () for a previously planned hip replacement that was put on hold last year due to COVID. Mrs. Martinez goes for steroid injections to her hip every 6 months. Resident is following with Dr. Tafoya for tremors and is currently on primidone.       Neuropathy    The onset of symptoms is unknown. It is located in the LLE and RLE region. The distribution is symmetrical.       Review of Systems   Constitutional: Negative.    HENT: Negative.     Eyes: Negative.    Respiratory: Negative.     Cardiovascular: Negative.    Gastrointestinal: Negative.  Negative for constipation and nausea.   Endocrine: Negative.    Genitourinary: Negative.    Musculoskeletal:  Positive for back pain.   Skin: Negative.    Neurological: Negative.    Psychiatric/Behavioral: Negative.  Negative for sleep disturbance. The patient is not hyperactive.    All other systems reviewed and are negative.      Objective     /73   Pulse 73   Temp 36.7 °C (98.1 °F)   Resp 19   Wt 70.7 kg (155 lb 14.4 oz)   SpO2 95%   BMI 29.46 kg/m²    Physical Exam  Constitutional:       General: She is not in acute distress.  HENT:      Head: Normocephalic.      Nose: Nose normal.      Mouth/Throat:      Mouth: Mucous membranes are moist.      Pharynx: Oropharynx is clear.   Eyes:      Pupils: Pupils are equal, round, and reactive " to light.   Cardiovascular:      Rate and Rhythm: Normal rate and regular rhythm.      Pulses: Normal pulses.      Heart sounds: Normal heart sounds.   Pulmonary:      Effort: Pulmonary effort is normal. No respiratory distress.      Breath sounds: Normal breath sounds. No wheezing.   Abdominal:      General: Abdomen is flat.      Palpations: Abdomen is soft.   Musculoskeletal:         General: Normal range of motion.      Cervical back: Normal range of motion.      Right lower leg: No edema.      Left lower leg: No edema.   Skin:     General: Skin is warm and dry.      Capillary Refill: Capillary refill takes 2 to 3 seconds.   Neurological:      General: No focal deficit present.      Mental Status: She is alert. Mental status is at baseline.   Psychiatric:         Mood and Affect: Mood normal.         Behavior: Behavior normal.       LABS  8/7/23  BASIC MET PNL INCL GFR (BMP) RAFFY  ~ GLUCOSE 137 H mg/dL  GLUCOSE, FASTING 65-99 mg/dL  GLUCOSE, NON-FASTING  mg/dL  SODIUM 140 136-145 mEq/L  POTASSIUM 4.0 3.5-5.3 mEq/L  CHLORIDE 101  mEq/L  CARBON DIOXIDE (CO2) 25 21-33 mEq/L  BUN (UREA NITROGEN) 10 7-25 mg/dL  CREATININE 0.6 0.6-1.2 mg/dL  BUN/CREATININE RATIO 17 6-22  GFR- 114 >60 mL/min/1.73 m2  GFR-NON-AFRICAN AMERICAN 95 >60 mL/min/1.73 m2   Stage of CKD eGFR (mL/min/1.73 square meters)   Stage 1 >/= 90 or > 90   Stage 2 60 - 89   Stage 3 30 - 59   Stage 4 15 - 29   Stage 5 </= 14 or < 15  ** GFR is reliable for adults 17 to 69 years with stable kidney   function.  CALCIUM 9.1 8.4-10.2 mg/dL  CBC W/DIFF RAFFY  WBC 4.5 4.5-10.8 K/cmm  RBC 4.26 3.90-5.40 M/cmm  HEMOGLOBIN 13.1 12.0-16.0 g/dL  HEMATOCRIT 40.5 36.0-48.0 %  MCV 95.1 80.0-100.0 fL  MCH 30.8 26.0-35.0 pg  MCHC 32.4 31.0-36.5 g/dL  RDW 14.3 11.0-16.0 %  PLATELET 191 150-450 K/cmm  MPV 7.7 6.5-12.0 fL  NEUTROPHILS 50.1 40.0-80.0 %  LYMPHS 37.2 13.0-48.0 %  MONOCYTES 9.6 2.0-12.0 %  EOS 2.4 0.0-8.0 %  BASO 0.7 0.0-2.0 %  NEUTS  (ABSOLUTE) 2.20 1.50-7.60 K/uL    Assessment/Plan   Problem List Items Addressed This Visit       Restless leg syndrome     Takes ropinerole  Denies break thru s/s           Chronic pain     Sees pain mgmt  No changes in meds  Pain meds still effective  monitor         Benign essential tremor     Has been on primidone  Sees neurology  No changes in s/s         Vascular dementia with behavioral disturbance (CMS/HCC)     Continue cymbalta  Uses vistaril prn for anxiety which helps  Monitor for anxiety           Neuropathy - Primary     Continues on gabapentin  Reports no break thru  Continue to monitor

## 2023-08-21 ENCOUNTER — NURSING HOME VISIT (OUTPATIENT)
Dept: POST ACUTE CARE | Facility: EXTERNAL LOCATION | Age: 88
End: 2023-08-21
Payer: MEDICARE

## 2023-08-21 DIAGNOSIS — M79.7 FIBROMYALGIA: ICD-10-CM

## 2023-08-21 DIAGNOSIS — E44.1 MILD PROTEIN-CALORIE MALNUTRITION (MULTI): Primary | ICD-10-CM

## 2023-08-21 DIAGNOSIS — E03.9 HYPOTHYROIDISM, UNSPECIFIED TYPE: ICD-10-CM

## 2023-08-21 DIAGNOSIS — E11.40 TYPE 2 DIABETES MELLITUS WITH DIABETIC NEUROPATHY, UNSPECIFIED WHETHER LONG TERM INSULIN USE (MULTI): ICD-10-CM
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
## 2023-08-21 DIAGNOSIS — F32.A DEPRESSION, UNSPECIFIED DEPRESSION TYPE: ICD-10-CM

## 2023-08-21 PROCEDURE — G0317 PROLONG NURSING FAC EVAL 15M: HCPCS | Performed by: NURSE PRACTITIONER

## 2023-08-21 PROCEDURE — 99310 SBSQ NF CARE HIGH MDM 45: CPT | Performed by: NURSE PRACTITIONER

## 2023-08-21 ASSESSMENT — PAIN SCALES - GENERAL: PAINLEVEL: 3

## 2023-08-21 NOTE — LETTER
Patient: Maria Luz Martinez  : 1935    Encounter Date: 2023    MRN:[id number]     Subjective   Patient ID: Maria Luz Martinez is a 87 y.o. female who presents for No chief complaint on file..  This is an 87 year old female who is a [facility] resident. Mrs. 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 Resident follows with neurologist (Dr. Ahuja) and pain mgmt () for a previously planned hip replacement that was put on hold last year due to COVID. Mrs. Martinez goes for steroid injections to her hip every 6 months. Resident is following with Dr. Tafoya for tremors and is currently on primidone.     Fall  Pertinent negatives include no nausea.       Review of Systems   Constitutional:  Negative for appetite change and fatigue.   HENT:  Negative for congestion and sore throat.    Eyes: Negative.  Negative for photophobia.   Respiratory:  Negative for cough and shortness of breath.    Cardiovascular: Negative.    Gastrointestinal:  Negative for constipation and nausea.   Endocrine: Negative.    Genitourinary:  Negative for dysuria and flank pain.   Musculoskeletal:  Positive for gait problem and myalgias.   Skin: Negative.    Psychiatric/Behavioral: Negative.     All other systems reviewed and are negative.    Objective     There were no vitals taken for this visit.   Physical Exam  Constitutional:       General: She is not in acute distress.  HENT:      Head: Normocephalic.      Nose: Nose normal.      Mouth/Throat:      Mouth: Mucous membranes are moist.      Pharynx: Oropharynx is clear.   Eyes:      Extraocular Movements: Extraocular movements intact.      Conjunctiva/sclera: Conjunctivae normal.   Cardiovascular:      Rate and Rhythm: Normal rate. Rhythm irregular.      Pulses: Normal pulses.      Heart sounds: Normal heart sounds.   Pulmonary:      Effort: Pulmonary effort is normal. No  respiratory distress.      Breath sounds: Normal breath sounds. No wheezing.   Abdominal:      General: Abdomen is flat. Bowel sounds are normal. There is no distension.      Palpations: Abdomen is soft.   Musculoskeletal:         General: Normal range of motion.      Cervical back: Normal range of motion.   Skin:     General: Skin is warm and dry.   Neurological:      Mental Status: She is alert. Mental status is at baseline.   Psychiatric:         Mood and Affect: Mood normal.         Thought Content: Thought content normal.     LABS:  8/7/23:  BASIC MET PNL INCL GFR (BMP) RAFFY  ~ GLUCOSE 137 H mg/dL  GLUCOSE, FASTING 65-99 mg/dL  GLUCOSE, NON-FASTING  mg/dL  SODIUM 140 136-145 mEq/L  POTASSIUM 4.0 3.5-5.3 mEq/L  CHLORIDE 101  mEq/L  CARBON DIOXIDE (CO2) 25 21-33 mEq/L  BUN (UREA NITROGEN) 10 7-25 mg/dL  CREATININE 0.6 0.6-1.2 mg/dL  BUN/CREATININE RATIO 17 6-22  GFR- 114 >60 mL/min/1.73 m2  GFR-NON-AFRICAN AMERICAN 95 >60 mL/min/1.73 m2   Stage of CKD eGFR (mL/min/1.73 square meters)   Stage 1 >/= 90 or > 90   Stage 2 60 - 89   Stage 3 30 - 59   Stage 4 15 - 29   Stage 5 </= 14 or < 15  ** GFR is reliable for adults 17 to 69 years with stable kidney   function.  CALCIUM 9.1 8.4-10.2 mg/dL  CBC W/DIFF RAFFY  WBC 4.5 4.5-10.8 K/cmm  RBC 4.26 3.90-5.40 M/cmm  HEMOGLOBIN 13.1 12.0-16.0 g/dL  HEMATOCRIT 40.5 36.0-48.0 %  MCV 95.1 80.0-100.0 fL  MCH 30.8 26.0-35.0 pg  MCHC 32.4 31.0-36.5 g/dL  RDW 14.3 11.0-16.0 %  PLATELET 191 150-450 K/cmm  MPV 7.7 6.5-12.0 fL  NEUTROPHILS 50.1 40.0-80.0 %  LYMPHS 37.2 13.0-48.0 %  MONOCYTES 9.6 2.0-12.0 %  EOS 2.4 0.0-8.0 %  BASO 0.7 0.0-2.0 %  NEUTS (ABSOLUTE) 2.20 1.50-7.60 K/uL    Assessment/Plan   Problem List Items Addressed This Visit       DM2 (diabetes mellitus, type 2) (CMS/[facility])     Blood sugars have overall been stable'  Continue metformin bid         Fibromyalgia     Monitor pain  Resident followed by pain mgmt physician         Hypothyroidism      Continue current dose of levothroid  Monitor labs         Depression    Mild protein-calorie malnutrition (CMS/HCC) - Primary     Encourage resident to eat at meals  Monitor protein intake         Fall     Xray ordered of right arm/shoulder and right leg/hip  Resident c/o right shoulder pain which is a chronic injury  Pain meds given               Total time after seeing [atient, reviewing chart, and discussing with family: 65 minutes      Electronically Signed By: CAITY Thakur   9/4/23  4:33 PM

## 2023-09-01 ENCOUNTER — NURSING HOME VISIT (OUTPATIENT)
Dept: INTERNAL MEDICINE | Facility: HOSPITAL | Age: 88
End: 2023-09-01
Payer: MEDICARE

## 2023-09-01 DIAGNOSIS — E11.40 TYPE 2 DIABETES MELLITUS WITH DIABETIC NEUROPATHY, UNSPECIFIED WHETHER LONG TERM INSULIN USE (MULTI): ICD-10-CM

## 2023-09-01 DIAGNOSIS — G25.81 RESTLESS LEG SYNDROME: ICD-10-CM

## 2023-09-01 DIAGNOSIS — H54.40 BLINDNESS OF LEFT EYE WITH NORMAL VISION IN CONTRALATERAL EYE: ICD-10-CM

## 2023-09-01 DIAGNOSIS — E03.9 HYPOTHYROIDISM, UNSPECIFIED TYPE: Primary | ICD-10-CM

## 2023-09-01 DIAGNOSIS — F01.518 VASCULAR DEMENTIA WITH BEHAVIORAL DISTURBANCE (MULTI): ICD-10-CM

## 2023-09-01 DIAGNOSIS — G89.4 CHRONIC PAIN SYNDROME: ICD-10-CM

## 2023-09-01 DIAGNOSIS — G62.9 NEUROPATHY: ICD-10-CM

## 2023-09-01 DIAGNOSIS — G25.0 BENIGN ESSENTIAL TREMOR: ICD-10-CM

## 2023-09-01 NOTE — PROGRESS NOTES
Patient seen at [facility].    Chief Complaint:  Follow up    HPI:  Patient has no new complaints at this time. Patient is participating in therapy sessions.    ROS:  12-pt ROS was reviewed with patient and was negative, unless otherwise noted in HPI.    PMHx; SocHx; FamHx; Allergies; Medications: all reviewed, see CNP notes, EMR, and records for further details.    LABS: available labs were reviewed    VITALS: vitals reviewed, see EMR for further details    PHYSICAL EXAM:  GEN: calm, no acute distress  HEENT: PER, EOMI, MMM  NECK: supple  CV: S1, S2, RRR  PULM: unlabored breathing, CTAB  ABD: soft, NT  Neuro: L eye blindness, no new gross focal deficits  PSYCH: appropriate affect    Vascular dementia w/ behavioral disturbance  RLS  Benign essential tremor  Chronic pain  Neuropathy    PLAN:  Continue therapy with PT/OT/ST as indicated.  Prior records and hospital notes reviewed.  I agree with plan of care as detailed in CNP note.  Fall precautions.  I discussed case with nursing staff.  Advised close PCP fu as outpatient as indicated.    Abi Mon D.O. PGY3    Trainee role: Resident    I saw and evaluated the patient. I personally obtained the key and critical portions of the history and physical exam or was physically present for key and critical portions performed by the trainee. I reviewed the trainee's documentation and discussed the patient with the trainee. I agree with the trainee's medical decision making as documented on the trainee's notes.    Jerry Torres M.D.

## 2023-09-04 VITALS
WEIGHT: 155.9 LBS | RESPIRATION RATE: 18 BRPM | HEART RATE: 76 BPM | SYSTOLIC BLOOD PRESSURE: 126 MMHG | TEMPERATURE: 97.4 F | BODY MASS INDEX: 29.46 KG/M2 | OXYGEN SATURATION: 95 % | DIASTOLIC BLOOD PRESSURE: 68 MMHG
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
## 2023-09-04 PROBLEM — E44.1 MILD PROTEIN-CALORIE MALNUTRITION (MULTI): Status: ACTIVE | Noted: 2023-09-04

## 2023-09-04 ASSESSMENT — ENCOUNTER SYMPTOMS
COUGH: 0
FLANK PAIN: 0
SORE THROAT: 0
ENDOCRINE NEGATIVE: 1
PHOTOPHOBIA: 0
EYES NEGATIVE: 1
FATIGUE: 0
DYSURIA: 0
CONSTIPATION: 0
PSYCHIATRIC NEGATIVE: 1
MYALGIAS: 1
NAUSEA: 0
CARDIOVASCULAR NEGATIVE: 1
SHORTNESS OF BREATH: 0
APPETITE CHANGE: 0

## 2023-09-04 NOTE — ASSESSMENT & PLAN NOTE
Xray ordered of right arm/shoulder and right leg/hip  Resident c/o right shoulder pain which is a chronic injury  Pain meds given

## 2023-09-04 NOTE — PROGRESS NOTES
MRN:[id number]     Subjective   Patient ID: Maria Luz Martinez is a 87 y.o. female who presents for No chief complaint on file..  This is an 87 year old female who is a [facility] resident. Mrs. 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 Resident follows with neurologist (Dr. Ahuja) and pain mgmt () for a previously planned hip replacement that was put on hold last year due to COVID. Mrs. Martinez goes for steroid injections to her hip every 6 months. Resident is following with Dr. Tafoya for tremors and is currently on primidone.     Fall  Pertinent negatives include no nausea.       Review of Systems   Constitutional:  Negative for appetite change and fatigue.   HENT:  Negative for congestion and sore throat.    Eyes: Negative.  Negative for photophobia.   Respiratory:  Negative for cough and shortness of breath.    Cardiovascular: Negative.    Gastrointestinal:  Negative for constipation and nausea.   Endocrine: Negative.    Genitourinary:  Negative for dysuria and flank pain.   Musculoskeletal:  Positive for gait problem and myalgias.   Skin: Negative.    Psychiatric/Behavioral: Negative.     All other systems reviewed and are negative.    Objective     There were no vitals taken for this visit.   Physical Exam  Constitutional:       General: She is not in acute distress.  HENT:      Head: Normocephalic.      Nose: Nose normal.      Mouth/Throat:      Mouth: Mucous membranes are moist.      Pharynx: Oropharynx is clear.   Eyes:      Extraocular Movements: Extraocular movements intact.      Conjunctiva/sclera: Conjunctivae normal.   Cardiovascular:      Rate and Rhythm: Normal rate. Rhythm irregular.      Pulses: Normal pulses.      Heart sounds: Normal heart sounds.   Pulmonary:      Effort: Pulmonary effort is normal. No respiratory distress.      Breath sounds: Normal breath sounds. No wheezing.    Abdominal:      General: Abdomen is flat. Bowel sounds are normal. There is no distension.      Palpations: Abdomen is soft.   Musculoskeletal:         General: Normal range of motion.      Cervical back: Normal range of motion.   Skin:     General: Skin is warm and dry.   Neurological:      Mental Status: She is alert. Mental status is at baseline.   Psychiatric:         Mood and Affect: Mood normal.         Thought Content: Thought content normal.     LABS:  8/7/23:  BASIC MET PNL INCL GFR (BMP) RAFFY  ~ GLUCOSE 137 H mg/dL  GLUCOSE, FASTING 65-99 mg/dL  GLUCOSE, NON-FASTING  mg/dL  SODIUM 140 136-145 mEq/L  POTASSIUM 4.0 3.5-5.3 mEq/L  CHLORIDE 101  mEq/L  CARBON DIOXIDE (CO2) 25 21-33 mEq/L  BUN (UREA NITROGEN) 10 7-25 mg/dL  CREATININE 0.6 0.6-1.2 mg/dL  BUN/CREATININE RATIO 17 6-22  GFR- 114 >60 mL/min/1.73 m2  GFR-NON-AFRICAN AMERICAN 95 >60 mL/min/1.73 m2   Stage of CKD eGFR (mL/min/1.73 square meters)   Stage 1 >/= 90 or > 90   Stage 2 60 - 89   Stage 3 30 - 59   Stage 4 15 - 29   Stage 5 </= 14 or < 15  ** GFR is reliable for adults 17 to 69 years with stable kidney   function.  CALCIUM 9.1 8.4-10.2 mg/dL  CBC W/DIFF RAFFY  WBC 4.5 4.5-10.8 K/cmm  RBC 4.26 3.90-5.40 M/cmm  HEMOGLOBIN 13.1 12.0-16.0 g/dL  HEMATOCRIT 40.5 36.0-48.0 %  MCV 95.1 80.0-100.0 fL  MCH 30.8 26.0-35.0 pg  MCHC 32.4 31.0-36.5 g/dL  RDW 14.3 11.0-16.0 %  PLATELET 191 150-450 K/cmm  MPV 7.7 6.5-12.0 fL  NEUTROPHILS 50.1 40.0-80.0 %  LYMPHS 37.2 13.0-48.0 %  MONOCYTES 9.6 2.0-12.0 %  EOS 2.4 0.0-8.0 %  BASO 0.7 0.0-2.0 %  NEUTS (ABSOLUTE) 2.20 1.50-7.60 K/uL    Assessment/Plan   Problem List Items Addressed This Visit       DM2 (diabetes mellitus, type 2) (CMS/[facility])     Blood sugars have overall been stable'  Continue metformin bid         Fibromyalgia     Monitor pain  Resident followed by pain mgmt physician         Hypothyroidism     Continue current dose of levothroid  Monitor labs         Depression    Mild  protein-calorie malnutrition (CMS/HCC) - Primary     Encourage resident to eat at meals  Monitor protein intake         Fall     Xray ordered of right arm/shoulder and right leg/hip  Resident c/o right shoulder pain which is a chronic injury  Pain meds given               Total time after seeing [atient, reviewing chart, and discussing with family: 65 minutes

## 2023-09-09 PROBLEM — S05.00XA: Status: ACTIVE | Noted: 2023-09-09

## 2023-09-09 PROBLEM — H16.9: Status: ACTIVE | Noted: 2023-09-09

## 2023-09-09 RX ORDER — PRIMIDONE 250 MG/1
1 TABLET ORAL NIGHTLY
COMMUNITY
End: 2024-05-03 | Stop reason: SDUPTHER

## 2023-09-09 RX ORDER — ROPINIROLE 3 MG/1
1 TABLET, FILM COATED ORAL NIGHTLY
COMMUNITY
Start: 2017-11-27

## 2023-09-09 RX ORDER — ALUMINUM HYDROXIDE, MAGNESIUM HYDROXIDE, AND SIMETHICONE 1200; 120; 1200 MG/30ML; MG/30ML; MG/30ML
SUSPENSION ORAL
COMMUNITY
End: 2023-10-17 | Stop reason: WASHOUT

## 2023-09-09 RX ORDER — NYSTATIN 100MM UNIT
POWDER (EA) MISCELLANEOUS
COMMUNITY

## 2023-09-09 RX ORDER — OFLOXACIN 3 MG/ML
1 SOLUTION/ DROPS OPHTHALMIC
COMMUNITY
Start: 2022-10-17 | End: 2023-10-17 | Stop reason: WASHOUT

## 2023-09-09 RX ORDER — OXYCODONE AND ACETAMINOPHEN 7.5; 325 MG/1; MG/1
1 TABLET ORAL EVERY 6 HOURS PRN
COMMUNITY
Start: 2022-06-02

## 2023-09-09 RX ORDER — LATANOPROST 50 UG/ML
SOLUTION/ DROPS OPHTHALMIC
COMMUNITY
Start: 2023-05-18 | End: 2023-10-17 | Stop reason: WASHOUT

## 2023-09-09 RX ORDER — GABAPENTIN 100 MG/1
1 CAPSULE ORAL NIGHTLY
COMMUNITY
End: 2023-10-17 | Stop reason: WASHOUT

## 2023-09-09 RX ORDER — LATANOPROST/PF 0.005 %
DROPS OPHTHALMIC (EYE)
COMMUNITY
Start: 2021-05-03 | End: 2023-10-17 | Stop reason: WASHOUT

## 2023-09-09 RX ORDER — HYDROXYZINE PAMOATE 50 MG/1
CAPSULE ORAL
COMMUNITY
Start: 2023-06-03 | End: 2023-10-17 | Stop reason: WASHOUT

## 2023-09-09 RX ORDER — TRIAMCINOLONE ACETONIDE 1 MG/G
CREAM TOPICAL
COMMUNITY
Start: 2023-02-03 | End: 2023-10-17 | Stop reason: WASHOUT

## 2023-09-09 RX ORDER — VIT A/VIT C/VIT E/ZINC/COPPER 4296-226
1 CAPSULE ORAL 2 TIMES DAILY
COMMUNITY

## 2023-09-09 RX ORDER — CIPROFLOXACIN 250 MG/1
TABLET, FILM COATED ORAL
COMMUNITY
Start: 2023-05-27 | End: 2023-10-17 | Stop reason: WASHOUT

## 2023-09-09 RX ORDER — NEOMYCIN SULFATE, POLYMYXIN B SULFATE AND DEXAMETHASONE 3.5; 10000; 1 MG/ML; [USP'U]/ML; MG/ML
1 SUSPENSION/ DROPS OPHTHALMIC NIGHTLY
COMMUNITY
End: 2023-10-17 | Stop reason: WASHOUT

## 2023-09-09 RX ORDER — PRIMIDONE 50 MG/1
1 TABLET ORAL DAILY
COMMUNITY
Start: 2021-11-05 | End: 2023-10-17 | Stop reason: WASHOUT

## 2023-09-09 RX ORDER — ROPINIROLE 4 MG/1
1 TABLET, FILM COATED ORAL DAILY
COMMUNITY
Start: 2017-11-27 | End: 2023-10-17 | Stop reason: WASHOUT

## 2023-09-09 RX ORDER — POLYETHYLENE GLYCOL 3350 17 G/17G
POWDER, FOR SOLUTION ORAL
COMMUNITY
End: 2023-10-17 | Stop reason: WASHOUT

## 2023-09-09 RX ORDER — PRIMIDONE 50 MG/1
50 TABLET ORAL DAILY
COMMUNITY
End: 2024-05-03 | Stop reason: SDUPTHER

## 2023-09-09 RX ORDER — NITROFURANTOIN (MACROCRYSTALS) 100 MG/1
CAPSULE ORAL
COMMUNITY
Start: 2023-03-12 | End: 2023-10-17 | Stop reason: WASHOUT

## 2023-09-09 RX ORDER — LEVOFLOXACIN 250 MG/1
TABLET ORAL
COMMUNITY
Start: 2023-01-07 | End: 2023-10-17 | Stop reason: WASHOUT

## 2023-09-09 RX ORDER — TRAZODONE HYDROCHLORIDE 50 MG/1
TABLET ORAL
COMMUNITY
Start: 2023-05-27 | End: 2023-10-17 | Stop reason: WASHOUT

## 2023-09-09 RX ORDER — DULOXETIN HYDROCHLORIDE 30 MG/1
1 CAPSULE, DELAYED RELEASE ORAL DAILY
COMMUNITY

## 2023-09-09 RX ORDER — SIMVASTATIN 40 MG
1 TABLET ORAL DAILY
COMMUNITY

## 2023-09-09 RX ORDER — CIPROFLOXACIN 500 MG/1
500 TABLET ORAL EVERY OTHER DAY
COMMUNITY
End: 2023-10-17 | Stop reason: WASHOUT

## 2023-09-09 RX ORDER — PREGABALIN 50 MG/1
1 CAPSULE ORAL 2 TIMES DAILY
COMMUNITY
End: 2023-10-17 | Stop reason: WASHOUT

## 2023-09-09 RX ORDER — LEVOFLOXACIN 500 MG/1
TABLET, FILM COATED ORAL
COMMUNITY
Start: 2023-01-08

## 2023-09-13 ENCOUNTER — NURSING HOME VISIT (OUTPATIENT)
Dept: POST ACUTE CARE | Facility: EXTERNAL LOCATION | Age: 88
End: 2023-09-13
Payer: MEDICARE

## 2023-09-13 DIAGNOSIS — Z20.7 EXPOSURE TO SCABIES: Primary | ICD-10-CM

## 2023-09-13 DIAGNOSIS — F32.A DEPRESSION, UNSPECIFIED DEPRESSION TYPE: ICD-10-CM

## 2023-09-13 DIAGNOSIS — N31.9 NEUROGENIC BLADDER: ICD-10-CM

## 2023-09-13 DIAGNOSIS — H54.40 BLINDNESS OF LEFT EYE WITH NORMAL VISION IN CONTRALATERAL EYE: ICD-10-CM

## 2023-09-13 DIAGNOSIS — G25.81 RESTLESS LEG SYNDROME: ICD-10-CM

## 2023-09-13 DIAGNOSIS — E44.1 MILD PROTEIN-CALORIE MALNUTRITION (MULTI): ICD-10-CM

## 2023-09-13 PROCEDURE — 99310 SBSQ NF CARE HIGH MDM 45: CPT | Performed by: NURSE PRACTITIONER

## 2023-09-13 NOTE — LETTER
Patient: Maria Luz Martinez  : 1935    Encounter Date: 2023    MRN:[id number]     Subjective   Patient ID: Maria Luz Martinez is a 87 y.o. female who presents for rash precaution (Nursing reports that resident's roommate had just returned from the dermatologist and was dx with scabies. Resident's family did not want her moved out of the room for fear it would make this resident more confused and possibly cause a fall. Resident does have surgery scheduled on her eye to remove her blind eye which has been causing her increased amount of pain. Eye doctor feels this is the best decision to help alleviate this. Surgery scheduled for 23. ).  This is an 87 year old female who is a [facility] resident. Mrs. 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 Resident follows with neurologist (Dr. Ahuja) and pain mgmt () for a previously planned hip replacement that was put on hold last year due to COVID. Mrs. Martinez goes for steroid injections to her hip every 6 months. Resident is following with Dr. Tafoya for tremors and is currently on primidone.       Review of Systems   Constitutional:  Negative for appetite change, chills and fatigue.   HENT:  Negative for congestion, postnasal drip and sneezing.    Eyes:  Positive for pain.        Eye pain chronic to blind eye (left)   Respiratory:  Negative for chest tightness and shortness of breath.    Cardiovascular:  Negative for chest pain.   Gastrointestinal:  Negative for constipation.   Genitourinary:  Negative for dysuria and frequency.   Musculoskeletal:  Negative for arthralgias.   Skin:  Negative for rash.   Neurological:  Positive for tremors. Negative for dizziness and light-headedness.   Hematological: Negative.    Psychiatric/Behavioral:  Negative for agitation and sleep disturbance. The patient is nervous/anxious.      Objective     /71    Pulse 77   Temp 36.2 °C (97.1 °F)   Resp 18   Wt 70.3 kg (155 lb)   SpO2 95%   BMI 29.29 kg/m²    Physical Exam  Constitutional:       General: She is not in acute distress.     Appearance: She is diaphoretic.   HENT:      Head: Normocephalic.      Mouth/Throat:      Mouth: Mucous membranes are moist.      Pharynx: Oropharynx is clear.   Eyes:      Conjunctiva/sclera: Conjunctivae normal.   Cardiovascular:      Rate and Rhythm: Normal rate and regular rhythm.      Pulses: Normal pulses.   Pulmonary:      Effort: Pulmonary effort is normal.      Breath sounds: Normal breath sounds.   Abdominal:      General: Bowel sounds are normal. There is no distension.      Palpations: Abdomen is soft.   Musculoskeletal:         General: Normal range of motion.      Cervical back: Normal range of motion.   Skin:     Capillary Refill: Capillary refill takes 2 to 3 seconds.   Neurological:      Mental Status: She is alert. Mental status is at baseline.   Psychiatric:         Mood and Affect: Mood normal.         Thought Content: Thought content normal.     Assessment/Plan   Problem List Items Addressed This Visit       Blindness of left eye     Left eye scheduled for extraction on 9/19  Eliquis stopped 5 days before  Orders received from surgeon         Neurogenic bladder    Restless leg syndrome    Depression     Mood remains stable  Continue current meds which appear to be effective         Mild protein-calorie malnutrition (CMS/HCC)    Exposure to scabies - Primary     No current rash at this time  Treating prophy;actically with the per,ethrin topical application x1  Continue to monitor for s/s  Denies itching  No active rash                 [unfilled]      Electronically Signed By: CAITY Thakur   9/16/23  9:15 PM

## 2023-09-16 VITALS
RESPIRATION RATE: 18 BRPM | BODY MASS INDEX: 29.29 KG/M2 | OXYGEN SATURATION: 95 % | HEART RATE: 77 BPM | DIASTOLIC BLOOD PRESSURE: 71 MMHG | WEIGHT: 155 LBS | TEMPERATURE: 97.1 F | SYSTOLIC BLOOD PRESSURE: 122 MMHG

## 2023-09-16 PROBLEM — Z20.7 EXPOSURE TO SCABIES: Status: ACTIVE | Noted: 2023-09-16

## 2023-09-16 ASSESSMENT — ENCOUNTER SYMPTOMS
EYE PAIN: 1
AGITATION: 0
ARTHRALGIAS: 0
TREMORS: 1
FATIGUE: 0
SHORTNESS OF BREATH: 0
CHEST TIGHTNESS: 0
HEMATOLOGIC/LYMPHATIC NEGATIVE: 1
FREQUENCY: 0
CHILLS: 0
NERVOUS/ANXIOUS: 1
CONSTIPATION: 0
LIGHT-HEADEDNESS: 0
APPETITE CHANGE: 0
DYSURIA: 0
DIZZINESS: 0
SLEEP DISTURBANCE: 0

## 2023-09-17 NOTE — ASSESSMENT & PLAN NOTE
No current rash at this time  Treating prophy;actically with the per,ethrin topical application x1  Continue to monitor for s/s  Denies itching  No active rash

## 2023-09-17 NOTE — ASSESSMENT & PLAN NOTE
Left eye scheduled for extraction on 9/19  Eliquis stopped 5 days before  Orders received from surgeon

## 2023-09-17 NOTE — PROGRESS NOTES
MRN:[id number]     Subjective   Patient ID: Maria Luz Martinez is a 87 y.o. female who presents for rash precaution (Nursing reports that resident's roommate had just returned from the dermatologist and was dx with scabies. Resident's family did not want her moved out of the room for fear it would make this resident more confused and possibly cause a fall. Resident does have surgery scheduled on her eye to remove her blind eye which has been causing her increased amount of pain. Eye doctor feels this is the best decision to help alleviate this. Surgery scheduled for 9/19/23. ).  This is an 87 year old female who is a [facility]TC resident. Mrs. 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 Resident follows with neurologist (Dr. Ahuja) and pain mgmt () for a previously planned hip replacement that was put on hold last year due to COVID. Mrs. Martinez goes for steroid injections to her hip every 6 months. Resident is following with Dr. Tafoya for tremors and is currently on primidone.       Review of Systems   Constitutional:  Negative for appetite change, chills and fatigue.   HENT:  Negative for congestion, postnasal drip and sneezing.    Eyes:  Positive for pain.        Eye pain chronic to blind eye (left)   Respiratory:  Negative for chest tightness and shortness of breath.    Cardiovascular:  Negative for chest pain.   Gastrointestinal:  Negative for constipation.   Genitourinary:  Negative for dysuria and frequency.   Musculoskeletal:  Negative for arthralgias.   Skin:  Negative for rash.   Neurological:  Positive for tremors. Negative for dizziness and light-headedness.   Hematological: Negative.    Psychiatric/Behavioral:  Negative for agitation and sleep disturbance. The patient is nervous/anxious.      Objective     /71   Pulse 77   Temp 36.2 °C (97.1 °F)   Resp 18   Wt 70.3 kg (155 lb)    SpO2 95%   BMI 29.29 kg/m²    Physical Exam  Constitutional:       General: She is not in acute distress.     Appearance: She is diaphoretic.   HENT:      Head: Normocephalic.      Mouth/Throat:      Mouth: Mucous membranes are moist.      Pharynx: Oropharynx is clear.   Eyes:      Conjunctiva/sclera: Conjunctivae normal.   Cardiovascular:      Rate and Rhythm: Normal rate and regular rhythm.      Pulses: Normal pulses.   Pulmonary:      Effort: Pulmonary effort is normal.      Breath sounds: Normal breath sounds.   Abdominal:      General: Bowel sounds are normal. There is no distension.      Palpations: Abdomen is soft.   Musculoskeletal:         General: Normal range of motion.      Cervical back: Normal range of motion.   Skin:     Capillary Refill: Capillary refill takes 2 to 3 seconds.   Neurological:      Mental Status: She is alert. Mental status is at baseline.   Psychiatric:         Mood and Affect: Mood normal.         Thought Content: Thought content normal.     Assessment/Plan   Problem List Items Addressed This Visit       Blindness of left eye     Left eye scheduled for extraction on 9/19  Eliquis stopped 5 days before  Orders received from surgeon         Neurogenic bladder    Restless leg syndrome    Depression     Mood remains stable  Continue current meds which appear to be effective         Mild protein-calorie malnutrition (CMS/HCC)    Exposure to scabies - Primary     No current rash at this time  Treating prophy;actically with the per,ethrin topical application x1  Continue to monitor for s/s  Denies itching  No active rash                 [unfilled]

## 2023-10-04 DIAGNOSIS — M25.511 ACUTE PAIN OF RIGHT SHOULDER: Primary | ICD-10-CM

## 2023-10-05 ENCOUNTER — APPOINTMENT (OUTPATIENT)
Dept: ORTHOPEDIC SURGERY | Facility: CLINIC | Age: 88
End: 2023-10-05
Payer: MEDICARE

## 2023-10-13 ENCOUNTER — APPOINTMENT (OUTPATIENT)
Dept: NEUROLOGY | Facility: CLINIC | Age: 88
End: 2023-10-13
Payer: MEDICARE

## 2023-10-16 PROBLEM — E11.9 DM2 (DIABETES MELLITUS, TYPE 2) (MULTI): Chronic | Status: ACTIVE | Noted: 2023-03-31

## 2023-10-16 PROBLEM — S05.00XA: Status: RESOLVED | Noted: 2023-09-09 | Resolved: 2023-10-16

## 2023-10-16 PROBLEM — F01.518 VASCULAR DEMENTIA WITH BEHAVIORAL DISTURBANCE (MULTI): Chronic | Status: ACTIVE | Noted: 2023-06-29

## 2023-10-16 PROBLEM — Z20.7 EXPOSURE TO SCABIES: Status: RESOLVED | Noted: 2023-09-16 | Resolved: 2023-10-16

## 2023-10-16 PROBLEM — G89.29 CHRONIC PAIN: Status: RESOLVED | Noted: 2023-03-31 | Resolved: 2023-10-16

## 2023-10-16 PROBLEM — E03.9 HYPOTHYROIDISM: Chronic | Status: ACTIVE | Noted: 2023-03-31

## 2023-10-16 PROBLEM — H16.9: Status: RESOLVED | Noted: 2023-09-09 | Resolved: 2023-10-16
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
## 2023-10-16 NOTE — PROGRESS NOTES
Referred by No ref. provider found    HPI I am seeing Maria Luz for evaluation of an abnormal EKG    Maria Luz is 87.  She has mild non-insulin-requiring diabetes and hyperlipidemia.  To her knowledge has never had a cardiac problem.  In 2017 she had a stroke and an echo was done which revealed mild to moderate aortic regurgitation.  She has no symptoms of chest pain or pressure no shortness of breath no palpitations.  She lives at [facility].  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  At that time the anesthesiologist noted a left sided intraventricular conduction delay on the EKG and advised that she get evaluated.  She is here with her daughter who also helps in providing history.    Past Medical History:  Problem List Items Addressed This Visit    None       Past Medical History:   Diagnosis Date    Diverticulitis of intestine, part unspecified, without perforation or abscess without bleeding     Diverticulitis    Fracture, femur (CMS/[facility]) 03/31/2023    Personal history of other diseases of the musculoskeletal system and connective tissue     History of osteoarthritis    Personal history of other endocrine, nutritional and metabolic disease     History of hyperlipidemia    Personal history of transient ischemic attack (TIA), and cerebral infarction without residual deficits 05/03/2021    H/O: CVA (cerebrovascular accident)             Past Surgical History:  She has a past surgical history that includes Cholecystectomy (07/17/2014); Total knee arthroplasty (07/17/2014); Cataract extraction (07/17/2014); and Hysterectomy (07/21/2014).      Social History:  She reports that she has never smoked. She has never used smokeless tobacco. She reports that she does not drink alcohol and does not use drugs.    Family History:  Family History   Problem Relation Name Age of Onset    COPD Mother      Breast cancer Sister      Ulcers Sister      Other (cervix uteri cancer) Sister          Allergies:  Iodine    Outpatient  Medications:  Current Outpatient Medications   Medication Instructions    acetaminophen (TYLENOL) 650 mg, oral, Every 4 hours PRN    alum-mag hydroxide-simeth (Mylanta) 200-200-20 mg/5 mL oral suspension oral    aspirin 81 mg, oral, Daily    biotin 1 mg tablet 1 tablet, oral, Daily    bisacodyl (DULCOLAX) 10 mg, rectal, Once as needed    brimonidine (AlphaGAN P) 0.2 % ophthalmic solution 1 drop, Left Eye, 2 times daily    cholecalciferol (VITAMIN D-3) 50 mcg, oral, Daily    cholecalciferol (VITAMIN D-3) 50 mcg, oral, Daily    ciprofloxacin (Cipro) 250 mg tablet     ciprofloxacin (CIPRO) 500 mg, oral, Every other day    DULoxetine (Cymbalta) 30 mg DR capsule 1 capsule, oral, Daily    ERGOCALCIFEROL, VITAMIN D2, ORAL 2,000 Units, oral, Daily RT    escitalopram (Lexapro) 20 mg tablet 1 tablet, oral, Daily    gabapentin (Neurontin) 100 mg capsule 1 capsule, oral, Nightly    hydrOXYzine pamoate (Vistaril) 50 mg capsule     latanoprost (Xalatan) 0.005 % ophthalmic solution     latanoprost, PF, 0.005 % drops ophthalmic (eye)    levoFLOXacin (Levaquin) 250 mg tablet     levoFLOXacin (Levaquin) 500 mg tablet     levothyroxine (Synthroid) 75 mcg tablet 1 tablet, oral, Daily    magnesium hydroxide (Milk of Magnesia) 400 mg/5 mL suspension 30 mL, oral    metFORMIN (GLUCOPHAGE) 500 mg, oral, 2 times daily with meals    mirabegron (Myrbetriq) 50 mg tablet extended release 24 hr 24 hr tablet 1 tablet, oral, Daily    neomycin-polymyxin-dexAMETHasone (Maxitrol) 3.5mg/mL-10,000 unit/mL-0.1 % ophthalmic suspension 1 drop, Left Eye, Nightly    nitrofurantoin (Macrodantin) 100 mg capsule     nystatin, bulk, 15 billion unit powder Nystatin Powder  Refills: 0    ofloxacin (Ocuflox) 0.3 % ophthalmic solution 1 drop, Left Eye, EVERY 1-2 HOURS<BR>    oxyCODONE-acetaminophen (Percocet) 5-325 mg tablet 1 tablet, oral, Every 6 hours PRN    oxyCODONE-acetaminophen (Percocet) 7.5-325 mg tablet 1 tablet, oral, 2 times daily, Give at 1pm and 7pm  daily    oxyCODONE-acetaminophen (Percocet) 7.5-325 mg tablet 1 tablet, oral, Every 6 hours PRN    polyethylene glycol (Miralax) 17 gram/dose powder oral    pregabalin (Lyrica) 50 mg capsule 1 capsule, oral, 2 times daily    primidone (Mysoline) 250 mg tablet 1 tablet, oral, Nightly    primidone (Mysoline) 50 mg tablet 1 tablet, oral, Daily    primidone (MYSOLINE) 50 mg, oral, 2 times daily    rOPINIRole (Requip) 3 mg tablet 1 tablet, oral, Nightly    rOPINIRole (Requip) 4 mg tablet 1 tablet, oral, Daily    traZODone (Desyrel) 50 mg tablet     triamcinolone (Kenalog) 0.1 % cream     vit A,C and E-lutein-minerals (Ocuvite) 300 mcg-200 mg-27 mg-2 mg tablet 1 tablet, oral, Daily RT    vitamins A,C,E-zinc-copper (PreserVision AREDS) 4,296 mcg-226 mg-90 mg capsule 1 capsule, oral, 2 times daily    Zocor 40 mg tablet 1 tablet, oral, Daily        Last Recorded Vitals:  There were no vitals filed for this visit.    Physical Exam    Physical  Patient is alert and oriented x3.  HEENT is unremarkable with the exception of enucleation of the left eye mucous members are moist  Neck no JVP no bruits upstrokes are full no thyromegaly  Lungs are clear bilaterally.  No wheezing crackles or rales  Heart regular rhythm normal S1-S2 there is no S3 there is a soft ejection murmur.  Diastolic rumble was not heard.  Abdomen is soft vessels are positive nontender nondistended no organomegaly no pulsatile masses  Extremities have no edema.  Distal pulses present palpable.  Neuro is grossly nonfocal  Skin has no rashes     Last Labs:  CBC -  Lab Results   Component Value Date    WBC 6.8 02/14/2022    HGB 8.0 (L) 02/14/2022    HCT 25.2 (L) 02/14/2022    MCV 97 02/14/2022     02/14/2022       CMP -  Lab Results   Component Value Date    CALCIUM 7.4 (L) 02/14/2022    PROT 4.2 (L) 02/13/2022    ALBUMIN 2.3 (L) 02/13/2022    AST 25 02/13/2022    ALT 10 02/13/2022    ALKPHOS 60 02/13/2022    BILITOT 0.4 02/13/2022       LIPID PANEL -   No  "results found for: \"CHOL\", \"HDL\", \"CHHDL\", \"LDL\", \"VLDL\", \"TRIG\", \"NHDL\"    RENAL FUNCTION PANEL -   Lab Results   Component Value Date    K 3.9 02/14/2022       Lab Results   Component Value Date    HGBA1C 6.3 (A) 09/07/2021     Procedure    Echo 4/11/17 EF 60-65%, mild to moderate AI         Assessment/Plan   1.  Abnormal EKG.  An EKG will be repeated today.  It sounds as though she might of had a left anterior fascicular block or left bundle branch block.  We will review.  She has no cardiac symptoms.    2.  Aortic insufficiency.  In 2017 this was mild to moderate.  Repeat echocardiogram will be obtained.    3.  Hyperlipidemia.  This is managed at [place].    4.  Anemia.  As I reviewed the records in February 2022 she was very anemic.  Her daughter is confident that she has had multiple blood test taken at [facility] to review this.    EKG today.  2D echo.  They can call 1 week after to review results.  At this point I am not suspicious of any significant cardiac abnormalities.    Derrick Lechuga MD     Instructions and follow up    "

## 2023-10-17 ENCOUNTER — OFFICE VISIT (OUTPATIENT)
Dept: CARDIOLOGY | Facility: CLINIC | Age: 88
End: 2023-10-17
Payer: MEDICARE

## 2023-10-17 VITALS
DIASTOLIC BLOOD PRESSURE: 68 MMHG | WEIGHT: 157 LBS | BODY MASS INDEX: 29.66 KG/M2 | OXYGEN SATURATION: 95 % | SYSTOLIC BLOOD PRESSURE: 102 MMHG | HEART RATE: 88 BPM

## 2023-10-17 DIAGNOSIS — R94.31 ABNORMAL EKG: Chronic | ICD-10-CM

## 2023-10-17 DIAGNOSIS — I35.1 NONRHEUMATIC AORTIC VALVE INSUFFICIENCY: Primary | Chronic | ICD-10-CM

## 2023-10-17 DIAGNOSIS — E78.00 HYPERCHOLESTEREMIA: Chronic | ICD-10-CM

## 2023-10-17 PROBLEM — I63.9 CVA (CEREBRAL VASCULAR ACCIDENT) (MULTI): Status: ACTIVE | Noted: 2023-03-31

## 2023-10-17 PROBLEM — D64.9 ANEMIA: Chronic | Status: ACTIVE | Noted: 2023-10-17

## 2023-10-17 PROBLEM — I63.9 CVA (CEREBRAL VASCULAR ACCIDENT) (MULTI): Chronic | Status: ACTIVE | Noted: 2023-03-31

## 2023-10-17 PROCEDURE — 1125F AMNT PAIN NOTED PAIN PRSNT: CPT | Performed by: INTERNAL MEDICINE

## 2023-10-17 PROCEDURE — 1159F MED LIST DOCD IN RCRD: CPT | Performed by: INTERNAL MEDICINE

## 2023-10-17 PROCEDURE — 99204 OFFICE O/P NEW MOD 45 MIN: CPT | Performed by: INTERNAL MEDICINE

## 2023-10-17 PROCEDURE — 1036F TOBACCO NON-USER: CPT | Performed by: INTERNAL MEDICINE

## 2023-10-17 PROCEDURE — 1160F RVW MEDS BY RX/DR IN RCRD: CPT | Performed by: INTERNAL MEDICINE

## 2023-10-17 RX ORDER — MIRTAZAPINE 7.5 MG/1
7.5 TABLET, FILM COATED ORAL NIGHTLY
COMMUNITY
Start: 2023-09-25

## 2023-10-17 NOTE — PATIENT INSTRUCTIONS
1.  Abnormal EKG.  An EKG will be repeated today.  It sounds as though she might of had a left anterior fascicular block or left bundle branch block.  We will review.  She has no cardiac symptoms.    2.  Aortic insufficiency.  In 2017 this was mild to moderate.  Repeat echocardiogram will be obtained.    3.  Hyperlipidemia.  This is managed at [place].    4.  Anemia.  As I reviewed the records in February 2022 she was very anemic.  Her daughter is confident that she has had multiple blood test taken at [facility] to review this.    EKG today.  2D echo.  They can call 1 week after to review results.  At this point I am not suspicious of any significant cardiac abnormalities.

## 2023-10-20 ENCOUNTER — NURSING HOME VISIT (OUTPATIENT)
Dept: POST ACUTE CARE | Facility: EXTERNAL LOCATION | Age: 88
End: 2023-10-20
Payer: MEDICARE

## 2023-10-20 VITALS
HEART RATE: 75 BPM | BODY MASS INDEX: 28.95 KG/M2 | OXYGEN SATURATION: 95 % | SYSTOLIC BLOOD PRESSURE: 132 MMHG | DIASTOLIC BLOOD PRESSURE: 65 MMHG | WEIGHT: 153.2 LBS | RESPIRATION RATE: 19 BRPM | TEMPERATURE: 97.5 F

## 2023-10-20 DIAGNOSIS — D64.9 ANEMIA, UNSPECIFIED TYPE: Chronic | ICD-10-CM

## 2023-10-20 DIAGNOSIS — F33.9 MAJOR DEPRESSION, RECURRENT, CHRONIC (CMS-HCC): Primary | ICD-10-CM

## 2023-10-20 DIAGNOSIS — N39.0 CHRONIC UTI: ICD-10-CM

## 2023-10-20 DIAGNOSIS — G25.0 BENIGN ESSENTIAL TREMOR: ICD-10-CM

## 2023-10-20 DIAGNOSIS — H54.40 BLINDNESS OF LEFT EYE WITH NORMAL VISION IN CONTRALATERAL EYE: ICD-10-CM

## 2023-10-20 DIAGNOSIS — M79.7 FIBROMYALGIA: ICD-10-CM

## 2023-10-20 PROCEDURE — G0317 PROLONG NURSING FAC EVAL 15M: HCPCS | Performed by: NURSE PRACTITIONER

## 2023-10-20 PROCEDURE — 99309 SBSQ NF CARE MODERATE MDM 30: CPT | Performed by: NURSE PRACTITIONER

## 2023-10-20 ASSESSMENT — ENCOUNTER SYMPTOMS
ENDOCRINE NEGATIVE: 1
RESPIRATORY NEGATIVE: 1
ALLERGIC/IMMUNOLOGIC NEGATIVE: 1
GASTROINTESTINAL NEGATIVE: 1
PSYCHIATRIC NEGATIVE: 1
CONSTITUTIONAL NEGATIVE: 1
EYES NEGATIVE: 1
MUSCULOSKELETAL NEGATIVE: 1
CARDIOVASCULAR NEGATIVE: 1
NEUROLOGICAL NEGATIVE: 1
HEMATOLOGIC/LYMPHATIC NEGATIVE: 1

## 2023-10-20 NOTE — LETTER
Patient: Maria Luz Martinez  : 1935    Encounter Date: 10/20/2023    MRN:[id number]     Subjective   Patient ID: Maria Luz Martinez is a 87 y.o. female who presents for UTI (Resident seen for follow up UTI. Recently urine sent growing ecoli. Currently being tx with prophylactic cipro which is on hold while current uti is treated. Tolerating atb without difficulty.).  This is an 87 year old female who is a [facility]TC resident. Mrs. 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 Resident follows with neurologist (Dr. Ahuja) and pain mgmt () for a previously planned hip replacement that was put on hold last year due to COVID. Mrs. Martinez goes for steroid injections to her hip every 6 months. Resident is following with Dr. Tafoya for tremors and is currently on primidone    UTI       Review of Systems   Constitutional: Negative.    HENT: Negative.     Eyes: Negative.    Respiratory: Negative.     Cardiovascular: Negative.    Gastrointestinal: Negative.    Endocrine: Negative.    Genitourinary: Negative.    Musculoskeletal: Negative.    Skin: Negative.    Allergic/Immunologic: Negative.    Neurological: Negative.    Hematological: Negative.    Psychiatric/Behavioral: Negative.       Objective     /65   Pulse 75   Temp 36.4 °C (97.5 °F)   Resp 19   Wt 69.5 kg (153 lb 3.2 oz)   SpO2 95%   BMI 28.95 kg/m²    Physical Exam  Vitals and nursing note reviewed.   Constitutional:       General: She is not in acute distress.  HENT:      Head: Normocephalic.      Nose: Nose normal.      Mouth/Throat:      Mouth: Mucous membranes are moist.      Pharynx: Oropharynx is clear.   Eyes:      Pupils: Pupils are equal, round, and reactive to light.   Cardiovascular:      Rate and Rhythm: Normal rate.      Pulses: Normal pulses.      Heart sounds: Normal heart sounds.   Pulmonary:      Effort: Pulmonary effort is  normal.      Breath sounds: Normal breath sounds.   Abdominal:      General: Abdomen is flat. Bowel sounds are normal. There is no distension.      Palpations: Abdomen is soft.   Musculoskeletal:         General: Normal range of motion.      Cervical back: Normal range of motion.   Skin:     General: Skin is warm and dry.   Neurological:      Mental Status: She is alert. Mental status is at baseline.   Psychiatric:         Mood and Affect: Mood normal.     LABS:  10/2/23  BASIC MET PNL INCL GFR (BMP) RAFFY  ~ GLUCOSE 164 H mg/dL  GLUCOSE, FASTING 65-99 mg/dL  GLUCOSE, NON-FASTING  mg/dL  SODIUM 139 136-145 mEq/L  POTASSIUM 4.2 3.5-5.3 mEq/L  CHLORIDE 101  mEq/L  CARBON DIOXIDE (CO2) 26 21-33 mEq/L  BUN (UREA NITROGEN) 17 7-25 mg/dL  ~ CREATININE 0.5 L 0.6-1.2 mg/dL  ~ BUN/CREATININE RATIO 34 H 6-22  GFR-AFRICAN AMERICAN 141 >60 mL/min/1.73 m2  GFR-NON-AFRICAN AMERICAN 117 >60 mL/min/1.73 m2   Stage of CKD eGFR (mL/min/1.73 square meters)   Stage 1 >/= 90 or > 90   Stage 2 60 - 89   Stage 3 30 - 59   Stage 4 15 - 29   Stage 5 </= 14 or < 15  ** GFR is reliable for adults 17 to 69 years with stable kidney   function.  CALCIUM 8.9 8.4-10.2 mg/dL  GLYCO-HGBA1C RAFFY  GLYCOHEMOGLOBIN-HGBA1C 6.1 4.1-6.1 %  ~ eAG (Mean Glucose) 128 H  mg/dL  CBC W/DIFF RAFFY  WBC 6.2 4.5-10.8 K/cmm  RBC 4.30 3.90-5.40 M/cmm  HEMOGLOBIN 13.1 12.0-16.0 g/dL  HEMATOCRIT 41.0 36.0-48.0 %  MCV 95.3 80.0-100.0 fL  MCH 30.5 26.0-35.0 pg  MCHC 32.0 31.0-36.5 g/dL  RDW 14.6 11.0-16.0 %  PLATELET 204 150-450 K/cmm  MPV 7.6 6.5-12.0 fL  NEUTROPHILS 55.1 40.0-80.0 %  LYMPHS 32.3 13.0-48.0 %  MONOCYTES 9.0 2.0-12.0 %    Assessment/Plan   Problem List Items Addressed This Visit             ICD-10-CM    Blindness of left eye H54.40     S/p removal of left eye  Doing well         Fibromyalgia M79.7    Chronic UTI N39.0     Currently on atb  Will discontinue prophylactic atb as it does not seem to be working  Continue bacid          Benign  essential tremor G25.0     Continue primidone  Sees dr Tafoya         Anemia (Chronic) D64.9     Blood count doing well   Recent labs showing stable hgb/hct         Major depression, recurrent, chronic (CMS/HCC) - Primary F33.9              Electronically Signed By: CAITY Thakur   10/20/23  1:37 PM

## 2023-10-20 NOTE — PROGRESS NOTES
MRN:[id number]     Subjective   Patient ID: Maria Luz Martinez is a 87 y.o. female who presents for UTI (Resident seen for follow up UTI. Recently urine sent growing ecoli. Currently being tx with prophylactic cipro which is on hold while current uti is treated. Tolerating atb without difficulty.).  This is an 87 year old female who is a LTC resident. Mrs. 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 Resident follows with neurologist (Dr. Ahuja) and pain mgmt () for a previously planned hip replacement that was put on hold last year due to COVID. Mrs. Martinez goes for steroid injections to her hip every 6 months. Resident is following with Dr. Tafoya for tremors and is currently on primidone    UTI       Review of Systems   Constitutional: Negative.    HENT: Negative.     Eyes: Negative.    Respiratory: Negative.     Cardiovascular: Negative.    Gastrointestinal: Negative.    Endocrine: Negative.    Genitourinary: Negative.    Musculoskeletal: Negative.    Skin: Negative.    Allergic/Immunologic: Negative.    Neurological: Negative.    Hematological: Negative.    Psychiatric/Behavioral: Negative.       Objective     /65   Pulse 75   Temp 36.4 °C (97.5 °F)   Resp 19   Wt 69.5 kg (153 lb 3.2 oz)   SpO2 95%   BMI 28.95 kg/m²    Physical Exam  Vitals and nursing note reviewed.   Constitutional:       General: She is not in acute distress.  HENT:      Head: Normocephalic.      Nose: Nose normal.      Mouth/Throat:      Mouth: Mucous membranes are moist.      Pharynx: Oropharynx is clear.   Eyes:      Pupils: Pupils are equal, round, and reactive to light.   Cardiovascular:      Rate and Rhythm: Normal rate.      Pulses: Normal pulses.      Heart sounds: Normal heart sounds.   Pulmonary:      Effort: Pulmonary effort is normal.      Breath sounds: Normal breath sounds.   Abdominal:       General: Abdomen is flat. Bowel sounds are normal. There is no distension.      Palpations: Abdomen is soft.   Musculoskeletal:         General: Normal range of motion.      Cervical back: Normal range of motion.   Skin:     General: Skin is warm and dry.   Neurological:      Mental Status: She is alert. Mental status is at baseline.   Psychiatric:         Mood and Affect: Mood normal.     LABS:  10/2/23  BASIC MET PNL INCL GFR (BMP) RAFFY  ~ GLUCOSE 164 H mg/dL  GLUCOSE, FASTING 65-99 mg/dL  GLUCOSE, NON-FASTING  mg/dL  SODIUM 139 136-145 mEq/L  POTASSIUM 4.2 3.5-5.3 mEq/L  CHLORIDE 101  mEq/L  CARBON DIOXIDE (CO2) 26 21-33 mEq/L  BUN (UREA NITROGEN) 17 7-25 mg/dL  ~ CREATININE 0.5 L 0.6-1.2 mg/dL  ~ BUN/CREATININE RATIO 34 H 6-22  GFR-AFRICAN AMERICAN 141 >60 mL/min/1.73 m2  GFR-NON-AFRICAN AMERICAN 117 >60 mL/min/1.73 m2   Stage of CKD eGFR (mL/min/1.73 square meters)   Stage 1 >/= 90 or > 90   Stage 2 60 - 89   Stage 3 30 - 59   Stage 4 15 - 29   Stage 5 </= 14 or < 15  ** GFR is reliable for adults 17 to 69 years with stable kidney   function.  CALCIUM 8.9 8.4-10.2 mg/dL  GLYCO-HGBA1C RAFFY  GLYCOHEMOGLOBIN-HGBA1C 6.1 4.1-6.1 %  ~ eAG (Mean Glucose) 128 H  mg/dL  CBC W/DIFF RAFFY  WBC 6.2 4.5-10.8 K/cmm  RBC 4.30 3.90-5.40 M/cmm  HEMOGLOBIN 13.1 12.0-16.0 g/dL  HEMATOCRIT 41.0 36.0-48.0 %  MCV 95.3 80.0-100.0 fL  MCH 30.5 26.0-35.0 pg  MCHC 32.0 31.0-36.5 g/dL  RDW 14.6 11.0-16.0 %  PLATELET 204 150-450 K/cmm  MPV 7.6 6.5-12.0 fL  NEUTROPHILS 55.1 40.0-80.0 %  LYMPHS 32.3 13.0-48.0 %  MONOCYTES 9.0 2.0-12.0 %    Assessment/Plan   Problem List Items Addressed This Visit             ICD-10-CM    Blindness of left eye H54.40     S/p removal of left eye  Doing well         Fibromyalgia M79.7    Chronic UTI N39.0     Currently on atb  Will discontinue prophylactic atb as it does not seem to be working  Continue bacid          Benign essential tremor G25.0     Continue primidone  Sees dr Tafoya          Anemia (Chronic) D64.9     Blood count doing well   Recent labs showing stable hgb/hct         Major depression, recurrent, chronic (CMS/HCC) - Primary F33.9

## 2023-10-20 NOTE — ASSESSMENT & PLAN NOTE
Currently on atb  Will discontinue prophylactic atb as it does not seem to be working  Continue bacid

## 2023-10-26 ENCOUNTER — OFFICE VISIT (OUTPATIENT)
Dept: ORTHOPEDIC SURGERY | Facility: CLINIC | Age: 88
End: 2023-10-26
Payer: MEDICARE

## 2023-10-26 ENCOUNTER — ANCILLARY PROCEDURE (OUTPATIENT)
Dept: RADIOLOGY | Facility: CLINIC | Age: 88
End: 2023-10-26
Payer: MEDICARE

## 2023-10-26 VITALS — BODY MASS INDEX: 29.64 KG/M2 | HEIGHT: 61 IN | WEIGHT: 157 LBS

## 2023-10-26 DIAGNOSIS — M19.011 ARTHRITIS OF RIGHT SHOULDER REGION: Primary | ICD-10-CM

## 2023-10-26 DIAGNOSIS — G89.29 CHRONIC RIGHT SHOULDER PAIN: ICD-10-CM

## 2023-10-26 DIAGNOSIS — M25.511 ACUTE PAIN OF RIGHT SHOULDER: ICD-10-CM

## 2023-10-26 DIAGNOSIS — M25.511 CHRONIC RIGHT SHOULDER PAIN: ICD-10-CM

## 2023-10-26 PROCEDURE — 20610 DRAIN/INJ JOINT/BURSA W/O US: CPT | Performed by: ORTHOPAEDIC SURGERY

## 2023-10-26 PROCEDURE — 1036F TOBACCO NON-USER: CPT | Performed by: ORTHOPAEDIC SURGERY

## 2023-10-26 PROCEDURE — 1159F MED LIST DOCD IN RCRD: CPT | Performed by: ORTHOPAEDIC SURGERY

## 2023-10-26 PROCEDURE — 1125F AMNT PAIN NOTED PAIN PRSNT: CPT | Performed by: ORTHOPAEDIC SURGERY

## 2023-10-26 PROCEDURE — 99203 OFFICE O/P NEW LOW 30 MIN: CPT | Performed by: ORTHOPAEDIC SURGERY

## 2023-10-26 PROCEDURE — 73030 X-RAY EXAM OF SHOULDER: CPT | Mod: RIGHT SIDE | Performed by: RADIOLOGY

## 2023-10-26 PROCEDURE — 73030 X-RAY EXAM OF SHOULDER: CPT | Mod: RT,FY

## 2023-10-26 PROCEDURE — 1160F RVW MEDS BY RX/DR IN RCRD: CPT | Performed by: ORTHOPAEDIC SURGERY

## 2023-10-26 RX ORDER — LIDOCAINE HYDROCHLORIDE 10 MG/ML
2 INJECTION INFILTRATION; PERINEURAL
Status: COMPLETED | OUTPATIENT
Start: 2023-10-26 | End: 2023-10-26

## 2023-10-26 RX ORDER — TRIAMCINOLONE ACETONIDE 40 MG/ML
2.5 INJECTION, SUSPENSION INTRA-ARTICULAR; INTRAMUSCULAR
Status: COMPLETED | OUTPATIENT
Start: 2023-10-26 | End: 2023-10-26

## 2023-10-26 RX ADMIN — LIDOCAINE HYDROCHLORIDE 2 ML: 10 INJECTION INFILTRATION; PERINEURAL at 14:17

## 2023-10-26 RX ADMIN — TRIAMCINOLONE ACETONIDE 2.5 MG: 40 INJECTION, SUSPENSION INTRA-ARTICULAR; INTRAMUSCULAR at 14:17

## 2023-10-26 NOTE — PROGRESS NOTES
Subjective    Patient ID: Maria Luz Martinez is a 87 y.o. female.    Chief Complaint: Pain of the Right Shoulder (FOR 4 MONTHS./NKI.)       This is an 87-year-old female with a history of right shoulder pain.  She struggles with arm motion and a sensation of weakness.  She has been under the care of pain management in 6 months ago did receive a right shoulder injection of cortisone through a posterior approach which was of limited benefit.  She has recently completed an MRI scan demonstrate arthritic changes of the glenohumeral joint as well as a large chronic appearing rotator cuff tear.  Patient is not experiencing any significant neck pain and there does not appear to be radiculopathy associated with this issue.    X-rays performed today reveal arthritic change of the right shoulder mild in nature with slight superior migration of the humeral head in relationship to the glenoid and acromion    This patient's past medical, social, and family history were reviewed as well as a review of systems including updates on the patient's information encounter sheet  Patient is a well-controlled diabetic by report  Patient does live in a senior living facility    Physical Examination  Constitutional: Patient's height and weight reviewed, appears well kempt  Psychiatric: Alert and oriented ×3, appropriate mood and behavior  Pulmonary: Breathing appears nonlabored, no apparent distress  Lymphatic: No appreciable lymphadenopathy to both the upper and lower extremities  Skin: No open lesions, rashes, ulcerations  Neurologic: Gross motor and sensory exam appear intact (except for abnormalities noted in the below muscle skeletal exam)    Musculoskeletal: The right glenohumeral joint appears to be grossly well reduced.  Strength of external rotation is 3+/5 all strength of internal rotation is 4+/5.  Strength of abduction limited by pain at 3+ or 5.  Crepitus of the glenohumeral joint with both passive and active attempts at range of  motion.  Active range of motion is quite limited secondary to weakness    Assessment: Chronic rotator cuff arthropathy right shoulder/arthritis    Plan: Observe to see if the right shoulder injection today is of benefit.  Continue with modifications of activities.  I do not feel physical therapy would be of benefit other than for some isometric strengthening exercises.  May consider repeat shoulder injections every 3 months as discussed.    Right Shoulder       L Inj/Asp: R glenohumeral (Right) on 10/26/2023 2:17 PM  Indications: pain  Details: anterior approach  Medications: 2.5 mg triamcinolone acetonide 40 mg/mL; 2 mL lidocaine 10 mg/mL (1 %)  Patient was prepped and draped in the usual sterile fashion.            Current Outpatient Medications:     DULoxetine (Cymbalta) 30 mg DR capsule, Take 1 capsule (30 mg) by mouth once daily., Disp: , Rfl:     levothyroxine (Synthroid) 75 mcg tablet, Take 1 tablet (75 mcg) by mouth once daily., Disp: , Rfl:     metFORMIN (Glucophage) 500 mg tablet, Take 1 tablet (500 mg) by mouth 2 times a day with meals., Disp: , Rfl:     mirabegron (Myrbetriq) 50 mg tablet extended release 24 hr 24 hr tablet, Take 1 tablet (50 mg) by mouth once daily., Disp: , Rfl:     mirtazapine (Remeron) 7.5 mg tablet, Take 1 tablet (7.5 mg) by mouth once daily at bedtime., Disp: , Rfl:     oxyCODONE-acetaminophen (Percocet) 7.5-325 mg tablet, Take 1 tablet by mouth every 6 hours if needed (pain)., Disp: , Rfl:     primidone (Mysoline) 50 mg tablet, Take 1 tablet (50 mg) by mouth once daily. Takes 1.5  tablets in the morning, Disp: , Rfl:     Zocor 40 mg tablet, Take 1 tablet (40 mg) by mouth once daily., Disp: , Rfl:     aspirin 81 mg chewable tablet, Chew 1 tablet (81 mg) once daily., Disp: , Rfl:     cholecalciferol (Vitamin D-3) 50 mcg (2,000 unit) capsule, Take 1 capsule (50 mcg) by mouth early in the morning.., Disp: , Rfl:     ERGOCALCIFEROL, VITAMIN D2, ORAL, Take 2,000 Units by mouth once  daily., Disp: , Rfl:     levoFLOXacin (Levaquin) 500 mg tablet, , Disp: , Rfl:     nystatin, bulk, 15 billion unit powder, Nystatin Powder Refills: 0, Disp: , Rfl:     primidone (Mysoline) 250 mg tablet, Take 1 tablet (250 mg) by mouth once daily at bedtime., Disp: , Rfl:     rOPINIRole (Requip) 3 mg tablet, Take 1 tablet (3 mg) by mouth once daily at bedtime., Disp: , Rfl:     vit A,C and E-lutein-minerals (Ocuvite) 300 mcg-200 mg-27 mg-2 mg tablet, Take 1 tablet by mouth once daily., Disp: , Rfl:     vitamins A,C,E-zinc-copper (PreserVision AREDS) 4,296 mcg-226 mg-90 mg capsule, Take 1 capsule by mouth twice a day., Disp: , Rfl:

## 2023-11-08 ENCOUNTER — HOSPITAL ENCOUNTER (OUTPATIENT)
Dept: CARDIOLOGY | Facility: CLINIC | Age: 88
Discharge: HOME | End: 2023-11-08
Payer: MEDICARE

## 2023-11-08 VITALS
HEIGHT: 61 IN | DIASTOLIC BLOOD PRESSURE: 65 MMHG | SYSTOLIC BLOOD PRESSURE: 132 MMHG | WEIGHT: 153 LBS | BODY MASS INDEX: 28.89 KG/M2

## 2023-11-08 DIAGNOSIS — I35.1 NONRHEUMATIC AORTIC VALVE INSUFFICIENCY: Chronic | ICD-10-CM

## 2023-11-08 PROCEDURE — 93306 TTE W/DOPPLER COMPLETE: CPT | Performed by: INTERNAL MEDICINE

## 2023-11-08 PROCEDURE — 93306 TTE W/DOPPLER COMPLETE: CPT

## 2023-11-12 LAB
AORTIC VALVE MEAN GRADIENT: 4.5
AORTIC VALVE PEAK VELOCITY: 1.55
AV PEAK GRADIENT: 9.6
AVA (PEAK VEL): 1.61
AVA (VTI): 1.73
EJECTION FRACTION APICAL 4 CHAMBER: 53.2
EJECTION FRACTION: 60
LEFT VENTRICLE INTERNAL DIMENSION DIASTOLE: 3.38 (ref 3.5–6)
LEFT VENTRICULAR OUTFLOW TRACT DIAMETER: 1.92
MITRAL VALVE E/A RATIO: 0.84
RIGHT VENTRICLE FREE WALL PEAK S': 14
RIGHT VENTRICLE PEAK SYSTOLIC PRESSURE: 22.8
TRICUSPID ANNULAR PLANE SYSTOLIC EXCURSION: 1.7

## 2023-11-13 ENCOUNTER — NURSING HOME VISIT (OUTPATIENT)
Dept: POST ACUTE CARE | Facility: EXTERNAL LOCATION | Age: 88
End: 2023-11-13
Payer: MEDICARE

## 2023-11-13 DIAGNOSIS — E03.9 HYPOTHYROIDISM, UNSPECIFIED TYPE: Chronic | ICD-10-CM

## 2023-11-13 DIAGNOSIS — E11.40 TYPE 2 DIABETES MELLITUS WITH DIABETIC NEUROPATHY, UNSPECIFIED WHETHER LONG TERM INSULIN USE (MULTI): Chronic | ICD-10-CM

## 2023-11-13 DIAGNOSIS — N31.9 NEUROGENIC BLADDER: Primary | ICD-10-CM

## 2023-11-13 DIAGNOSIS — H54.40 BLINDNESS OF LEFT EYE WITH NORMAL VISION IN CONTRALATERAL EYE: ICD-10-CM

## 2023-11-13 PROCEDURE — 99309 SBSQ NF CARE MODERATE MDM 30: CPT | Performed by: NURSE PRACTITIONER

## 2023-11-13 NOTE — LETTER
Patient: Maria Luz Martinez  : 1935    Encounter Date: 2023    MRN:[id number]     Subjective   Patient ID: Maria Luz Martinez is a 88 y.o. female who presents for overactive bladder (Resident seen for routine follow up of OAB. Dtr asking about medication clarification she is taking for OAB. Resident currently on Mirabegron. Resident without complaints at this time.  ).  This is an 87 year old female who is a LTC resident. Mrs. 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 Resident follows with neurologist (Dr. Ahuja) and pain mgmt () for a previously planned hip replacement that was put on hold last year due to COVID. Mrs. Martinez goes for steroid injections to her hip every 6 months. Resident is following with Dr. Tafoya for tremors and is currently on primidone    UTI     Review of Systems   Constitutional: Negative.    HENT: Negative.     Eyes: Negative.    Respiratory: Negative.     Cardiovascular: Negative.    Gastrointestinal: Negative.    Endocrine: Negative.    Genitourinary: Negative.    Musculoskeletal: Negative.    Skin: Negative.    Allergic/Immunologic: Negative.    Neurological: Negative.    Hematological: Negative.    Psychiatric/Behavioral: Negative.       Objective     /69   Pulse 72   Temp 36.6 °C (97.8 °F)   Resp 18   Wt 69.7 kg (153 lb 9.6 oz)   SpO2 98%   BMI 29.02 kg/m²    Physical Exam  Vitals and nursing note reviewed.   Constitutional:       General: She is not in acute distress.  HENT:      Head: Normocephalic.      Nose: Nose normal.      Mouth/Throat:      Mouth: Mucous membranes are moist.      Pharynx: Oropharynx is clear.   Eyes:      Pupils: Pupils are equal, round, and reactive to light.   Cardiovascular:      Rate and Rhythm: Normal rate.      Pulses: Normal pulses.      Heart sounds: Normal heart sounds.   Pulmonary:      Effort: Pulmonary effort  is normal.      Breath sounds: Normal breath sounds.   Abdominal:      General: Abdomen is flat. Bowel sounds are normal. There is no distension.      Palpations: Abdomen is soft.   Musculoskeletal:         General: Normal range of motion.      Cervical back: Normal range of motion.   Skin:     General: Skin is warm and dry.   Neurological:      Mental Status: She is alert. Mental status is at baseline.   Psychiatric:         Mood and Affect: Mood normal.     LABS  11/6/23  FP-HEPATIC FUNCTION PANEL RAFFY  ~ PROTEIN, TOTAL 5.9 L 6.0-8.3 g/dL  ALBUMIN 3.5 3.5-5.5 g/dL  A/G RATIO 1.5 1.0-2.3  ALKALINE PHOS 72  IU/L  AST (SGOT) 29 4-40 IU/L  ALT (SGPT) 33 4-55 IU/L  BILIRUBIN, TOTAL 0.2 0.2-1.2 mg/dL  BILIRUBIN, DIRECT 0.1 <0.4 mg/dL  LIPID PROFILE W/REFLEX DIRECT LDL RAFFY  CHOLESTEROL 138 <200 mg/dL  ~ TRIGLYCERIDES 297 H <150 mg/dL  ~ HDL 50 L >50 mg/dL  LDLc 29 <100  LDLc/HDL RATIO 0.6 <4:1  ~ VLDLc 59 H 5-40 mg/dL  VITAMIN D 25-OH TOTAL 33.89  ng/mL    Assessment/Plan   Problem List Items Addressed This Visit             ICD-10-CM    Blindness of left eye H54.40    DM2 (diabetes mellitus, type 2) (CMS/HCC) (Chronic) E11.9     Continue metformin twide daily  Labs q6months         Hypothyroidism (Chronic) E03.9     Continue to monitor labs and adjust dose prn         Neurogenic bladder - Primary N31.9     Explained to residents dtr that myrbegren is generic for myrbetriq per pharmacy  Continue current dose                   [unfilled]      Electronically Signed By: CAITY Thakur   11/22/23 11:40 AM

## 2023-11-22 VITALS
HEART RATE: 72 BPM | RESPIRATION RATE: 18 BRPM | DIASTOLIC BLOOD PRESSURE: 69 MMHG | OXYGEN SATURATION: 98 % | SYSTOLIC BLOOD PRESSURE: 128 MMHG | TEMPERATURE: 97.8 F | WEIGHT: 153.6 LBS | BODY MASS INDEX: 29.02 KG/M2

## 2023-11-22 ASSESSMENT — ENCOUNTER SYMPTOMS
EYES NEGATIVE: 1
ENDOCRINE NEGATIVE: 1
HEMATOLOGIC/LYMPHATIC NEGATIVE: 1
PSYCHIATRIC NEGATIVE: 1
CARDIOVASCULAR NEGATIVE: 1
ALLERGIC/IMMUNOLOGIC NEGATIVE: 1
MUSCULOSKELETAL NEGATIVE: 1
CONSTITUTIONAL NEGATIVE: 1
NEUROLOGICAL NEGATIVE: 1
GASTROINTESTINAL NEGATIVE: 1
RESPIRATORY NEGATIVE: 1

## 2023-11-22 NOTE — ASSESSMENT & PLAN NOTE
Explained to residents dtr that myrbegren is generic for myrbetriq per pharmacy  Continue current dose

## 2023-11-22 NOTE — PROGRESS NOTES
MRN:[id number]     Subjective   Patient ID: Maria Luz Martinez is a 88 y.o. female who presents for overactive bladder (Resident seen for routine follow up of OAB. Dtr asking about medication clarification she is taking for OAB. Resident currently on Mirabegron. Resident without complaints at this time.  ).  This is an 87 year old female who is a LTC resident. Mrs. 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 Resident follows with neurologist (Dr. Ahuja) and pain mgmt () for a previously planned hip replacement that was put on hold last year due to COVID. Mrs. aMrtinez goes for steroid injections to her hip every 6 months. Resident is following with Dr. Tafoya for tremors and is currently on primidone    UTI     Review of Systems   Constitutional: Negative.    HENT: Negative.     Eyes: Negative.    Respiratory: Negative.     Cardiovascular: Negative.    Gastrointestinal: Negative.    Endocrine: Negative.    Genitourinary: Negative.    Musculoskeletal: Negative.    Skin: Negative.    Allergic/Immunologic: Negative.    Neurological: Negative.    Hematological: Negative.    Psychiatric/Behavioral: Negative.       Objective     /69   Pulse 72   Temp 36.6 °C (97.8 °F)   Resp 18   Wt 69.7 kg (153 lb 9.6 oz)   SpO2 98%   BMI 29.02 kg/m²    Physical Exam  Vitals and nursing note reviewed.   Constitutional:       General: She is not in acute distress.  HENT:      Head: Normocephalic.      Nose: Nose normal.      Mouth/Throat:      Mouth: Mucous membranes are moist.      Pharynx: Oropharynx is clear.   Eyes:      Pupils: Pupils are equal, round, and reactive to light.   Cardiovascular:      Rate and Rhythm: Normal rate.      Pulses: Normal pulses.      Heart sounds: Normal heart sounds.   Pulmonary:      Effort: Pulmonary effort is normal.      Breath sounds: Normal breath sounds.   Abdominal:       General: Abdomen is flat. Bowel sounds are normal. There is no distension.      Palpations: Abdomen is soft.   Musculoskeletal:         General: Normal range of motion.      Cervical back: Normal range of motion.   Skin:     General: Skin is warm and dry.   Neurological:      Mental Status: She is alert. Mental status is at baseline.   Psychiatric:         Mood and Affect: Mood normal.     LABS  11/6/23  FP-HEPATIC FUNCTION PANEL RAFFY  ~ PROTEIN, TOTAL 5.9 L 6.0-8.3 g/dL  ALBUMIN 3.5 3.5-5.5 g/dL  A/G RATIO 1.5 1.0-2.3  ALKALINE PHOS 72  IU/L  AST (SGOT) 29 4-40 IU/L  ALT (SGPT) 33 4-55 IU/L  BILIRUBIN, TOTAL 0.2 0.2-1.2 mg/dL  BILIRUBIN, DIRECT 0.1 <0.4 mg/dL  LIPID PROFILE W/REFLEX DIRECT LDL RAFFY  CHOLESTEROL 138 <200 mg/dL  ~ TRIGLYCERIDES 297 H <150 mg/dL  ~ HDL 50 L >50 mg/dL  LDLc 29 <100  LDLc/HDL RATIO 0.6 <4:1  ~ VLDLc 59 H 5-40 mg/dL  VITAMIN D 25-OH TOTAL 33.89  ng/mL    Assessment/Plan   Problem List Items Addressed This Visit             ICD-10-CM    Blindness of left eye H54.40    DM2 (diabetes mellitus, type 2) (CMS/HCC) (Chronic) E11.9     Continue metformin twide daily  Labs q6months         Hypothyroidism (Chronic) E03.9     Continue to monitor labs and adjust dose prn         Neurogenic bladder - Primary N31.9     Explained to residents dtr that myrbegren is generic for myrbetriq per pharmacy  Continue current dose                   [unfilled]

## 2023-12-13 ENCOUNTER — NURSING HOME VISIT (OUTPATIENT)
Dept: POST ACUTE CARE | Facility: EXTERNAL LOCATION | Age: 88
End: 2023-12-13
Payer: MEDICARE

## 2023-12-13 DIAGNOSIS — G25.0 BENIGN ESSENTIAL TREMOR: ICD-10-CM

## 2023-12-13 DIAGNOSIS — E03.9 HYPOTHYROIDISM, UNSPECIFIED TYPE: Chronic | ICD-10-CM

## 2023-12-13 DIAGNOSIS — F32.A DEPRESSION, UNSPECIFIED DEPRESSION TYPE: ICD-10-CM

## 2023-12-13 DIAGNOSIS — N31.9 NEUROGENIC BLADDER: ICD-10-CM

## 2023-12-13 DIAGNOSIS — E11.40 TYPE 2 DIABETES MELLITUS WITH DIABETIC NEUROPATHY, UNSPECIFIED WHETHER LONG TERM INSULIN USE (MULTI): Primary | Chronic | ICD-10-CM

## 2023-12-13 DIAGNOSIS — D64.9 ANEMIA, UNSPECIFIED TYPE: Chronic | ICD-10-CM

## 2023-12-13 DIAGNOSIS — E55.9 VITAMIN D DEFICIENCY: ICD-10-CM

## 2023-12-13 PROCEDURE — 99310 SBSQ NF CARE HIGH MDM 45: CPT | Performed by: NURSE PRACTITIONER

## 2023-12-13 PROCEDURE — G0439 PPPS, SUBSEQ VISIT: HCPCS | Performed by: NURSE PRACTITIONER

## 2023-12-13 NOTE — LETTER
Patient: Maria Luz Martinez  : 1935    Encounter Date: 2023    MRN:[id number]     Subjective   Patient ID: Maria Luz Martinez is a 88 y.o. female who presents for Diabetes (Resident seen for routine follow up of diabetes. No new reports of becki or low blood sugars. Readings reviewed. Last A1C level was 6.1 in October this year. ).  This is an 87 year old female who is a LTC resident. Mrs. 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 Resident follows with neurologist (Dr. Ahuja) and pain mgmt () for a previously planned hip replacement that was put on hold last year due to COVID. Mrs. Martinez goes for steroid injections to her hip every 6 months. Resident is following with Dr. Tafoya for tremors and is currently on primidone    Diabetes    UTI     Review of Systems   Constitutional: Negative.    HENT: Negative.     Eyes: Negative.    Respiratory: Negative.     Cardiovascular: Negative.    Gastrointestinal: Negative.    Endocrine: Negative.    Genitourinary: Negative.    Musculoskeletal: Negative.    Skin: Negative.    Allergic/Immunologic: Negative.    Neurological: Negative.    Hematological: Negative.    Psychiatric/Behavioral: Negative.         Objective     /60   Pulse 68   Temp 36.7 °C (98 °F)   Resp 19   Wt 69.6 kg (153 lb 6.4 oz)   SpO2 96%   BMI 28.98 kg/m²    Physical Exam  Vitals and nursing note reviewed.   Constitutional:       General: She is not in acute distress.     Appearance: Normal appearance.   HENT:      Head: Normocephalic.      Nose: Nose normal.      Mouth/Throat:      Mouth: Mucous membranes are moist.      Pharynx: Oropharynx is clear. No oropharyngeal exudate.   Eyes:      Pupils: Pupils are equal, round, and reactive to light.   Cardiovascular:      Rate and Rhythm: Normal rate. Rhythm irregular.      Pulses: Normal pulses.      Heart sounds: Normal  heart sounds.   Pulmonary:      Effort: Pulmonary effort is normal.      Breath sounds: Normal breath sounds.   Abdominal:      General: Abdomen is flat. Bowel sounds are normal. There is no distension.      Palpations: Abdomen is soft.   Musculoskeletal:         General: Normal range of motion.      Cervical back: Normal range of motion.   Skin:     General: Skin is warm and dry.   Neurological:      Mental Status: She is alert. Mental status is at baseline.   Psychiatric:         Mood and Affect: Mood normal.     LABS  12/4/23  BASIC MET PNL INCL GFR (BMP) RAFFY  ~ GLUCOSE 100 H mg/dL  GLUCOSE, FASTING 65-99 mg/dL  GLUCOSE, NON-FASTING  mg/dL  SODIUM 143 136-145 mEq/L  POTASSIUM 4.1 3.5-5.3 mEq/L  CHLORIDE 104  mEq/L  CARBON DIOXIDE (CO2) 28 21-33 mEq/L  BUN (UREA NITROGEN) 16 7-25 mg/dL  ~ CREATININE 0.5 L 0.6-1.2 mg/dL  ~ BUN/CREATININE RATIO 32 H 6-22  GFR-AFRICAN AMERICAN 141 >60 mL/min/1.73 m2  GFR-NON-AFRICAN AMERICAN 116 >60 mL/min/1.73 m2   Stage of CKD eGFR (mL/min/1.73 square meters)   Stage 1 >/= 90 or > 90   Stage 2 60 - 89   Stage 3 30 - 59   Stage 4 15 - 29   Stage 5 </= 14 or < 15  ** GFR is reliable for adults 17 to 69 years with stable kidney   function.  CALCIUM 9.1 8.4-10.2 mg/dL  CBC W/DIFF RAFFY  WBC 5.4 4.5-10.8 K/cmm  RBC 4.24 3.90-5.40 M/cmm  HEMOGLOBIN 13.0 12.0-16.0 g/dL  HEMATOCRIT 40.5 36.0-48.0 %  MCV 95.4 80.0-100.0 fL  MCH 30.5 26.0-35.0 pg  MCHC 32.0 31.0-36.5 g/dL  RDW 15.8 11.0-16.0 %  PLATELET 180 150-450 K/cmm  MPV 7.7 6.5-12.0 fL  NEUTROPHILS 43.3 40.0-80.0 %  LYMPHS 43.9 13.0-48.0 %  MONOCYTES 9.8 2.0-12.0 %  EOS 2.6 0.0-8.0 %  BASO 0.4 0.0-2.0 %  NEUTS (ABSOLUTE) 2.40 1.50-7.60 K/uL      Assessment/Plan   Problem List Items Addressed This Visit             ICD-10-CM    DM2 (diabetes mellitus, type 2) (CMS/HCC) - Primary (Chronic) E11.9     Continue metformin bid  Blood sugars remain stable  Last A1c level in octber of 6. 1  Continue to follow         Hypothyroidism  (Chronic) E03.9     Remains on levothyroxine as ordered  Monitor labs and will adjust prn         Neurogenic bladder N31.9     Remains on mirabegron  Monitor for s/s         Vitamin D deficiency E55.9     Continue n vitamin d as ordered  Level checked yearly         Benign essential tremor G25.0    Depression F32.A     Continue to monitor for s/s         Anemia (Chronic) D64.9     Will continue to monitor for labs                   Electronically Signed By: CAITY Thakur   12/15/23  3:18 PM

## 2023-12-15 VITALS
SYSTOLIC BLOOD PRESSURE: 116 MMHG | TEMPERATURE: 98 F | HEART RATE: 68 BPM | OXYGEN SATURATION: 96 % | BODY MASS INDEX: 28.98 KG/M2 | WEIGHT: 153.4 LBS | DIASTOLIC BLOOD PRESSURE: 60 MMHG | RESPIRATION RATE: 19 BRPM

## 2023-12-15 ASSESSMENT — ACTIVITIES OF DAILY LIVING (ADL)
GROCERY_SHOPPING: TOTAL CARE
MANAGING_FINANCES: TOTAL CARE
DRESSING: NEEDS ASSISTANCE
DOING_HOUSEWORK: TOTAL CARE
BATHING: NEEDS ASSISTANCE
TAKING_MEDICATION: TOTAL CARE

## 2023-12-15 ASSESSMENT — ENCOUNTER SYMPTOMS
ALLERGIC/IMMUNOLOGIC NEGATIVE: 1
LOSS OF SENSATION IN FEET: 1
CONSTITUTIONAL NEGATIVE: 1
NEUROLOGICAL NEGATIVE: 1
EYES NEGATIVE: 1
GASTROINTESTINAL NEGATIVE: 1
HEMATOLOGIC/LYMPHATIC NEGATIVE: 1
RESPIRATORY NEGATIVE: 1
CARDIOVASCULAR NEGATIVE: 1
OCCASIONAL FEELINGS OF UNSTEADINESS: 1
DEPRESSION: 0
ENDOCRINE NEGATIVE: 1
PSYCHIATRIC NEGATIVE: 1
MUSCULOSKELETAL NEGATIVE: 1

## 2023-12-15 ASSESSMENT — PATIENT HEALTH QUESTIONNAIRE - PHQ9
SUM OF ALL RESPONSES TO PHQ9 QUESTIONS 1 AND 2: 0
2. FEELING DOWN, DEPRESSED OR HOPELESS: NOT AT ALL
1. LITTLE INTEREST OR PLEASURE IN DOING THINGS: NOT AT ALL

## 2023-12-15 NOTE — ASSESSMENT & PLAN NOTE
Continue metformin bid  Blood sugars remain stable  Last A1c level in octber of 6. 1  Continue to follow

## 2023-12-15 NOTE — PROGRESS NOTES
MRN:[id number]     Subjective   Patient ID: Maria Luz Martinez is a 88 y.o. female who presents for Diabetes (Resident seen for routine follow up of diabetes. No new reports of becki or low blood sugars. Readings reviewed. Last A1C level was 6.1 in October this year. ).  This is an 87 year old female who is a LT resident. Mrs. 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 Resident follows with neurologist (Dr. Ahuja) and pain mgmt () for a previously planned hip replacement that was put on hold last year due to COVID. Mrs. Martinez goes for steroid injections to her hip every 6 months. Resident is following with Dr. Tafoya for tremors and is currently on primidone    Diabetes    UTI     Review of Systems   Constitutional: Negative.    HENT: Negative.     Eyes: Negative.    Respiratory: Negative.     Cardiovascular: Negative.    Gastrointestinal: Negative.    Endocrine: Negative.    Genitourinary: Negative.    Musculoskeletal: Negative.    Skin: Negative.    Allergic/Immunologic: Negative.    Neurological: Negative.    Hematological: Negative.    Psychiatric/Behavioral: Negative.         Objective     /60   Pulse 68   Temp 36.7 °C (98 °F)   Resp 19   Wt 69.6 kg (153 lb 6.4 oz)   SpO2 96%   BMI 28.98 kg/m²    Physical Exam  Vitals and nursing note reviewed.   Constitutional:       General: She is not in acute distress.     Appearance: Normal appearance.   HENT:      Head: Normocephalic.      Nose: Nose normal.      Mouth/Throat:      Mouth: Mucous membranes are moist.      Pharynx: Oropharynx is clear. No oropharyngeal exudate.   Eyes:      Pupils: Pupils are equal, round, and reactive to light.   Cardiovascular:      Rate and Rhythm: Normal rate. Rhythm irregular.      Pulses: Normal pulses.      Heart sounds: Normal heart sounds.   Pulmonary:      Effort: Pulmonary effort is normal.       Breath sounds: Normal breath sounds.   Abdominal:      General: Abdomen is flat. Bowel sounds are normal. There is no distension.      Palpations: Abdomen is soft.   Musculoskeletal:         General: Normal range of motion.      Cervical back: Normal range of motion.   Skin:     General: Skin is warm and dry.   Neurological:      Mental Status: She is alert. Mental status is at baseline.   Psychiatric:         Mood and Affect: Mood normal.     LABS  12/4/23  BASIC MET PNL INCL GFR (BMP) RAFFY  ~ GLUCOSE 100 H mg/dL  GLUCOSE, FASTING 65-99 mg/dL  GLUCOSE, NON-FASTING  mg/dL  SODIUM 143 136-145 mEq/L  POTASSIUM 4.1 3.5-5.3 mEq/L  CHLORIDE 104  mEq/L  CARBON DIOXIDE (CO2) 28 21-33 mEq/L  BUN (UREA NITROGEN) 16 7-25 mg/dL  ~ CREATININE 0.5 L 0.6-1.2 mg/dL  ~ BUN/CREATININE RATIO 32 H 6-22  GFR-AFRICAN AMERICAN 141 >60 mL/min/1.73 m2  GFR-NON-AFRICAN AMERICAN 116 >60 mL/min/1.73 m2   Stage of CKD eGFR (mL/min/1.73 square meters)   Stage 1 >/= 90 or > 90   Stage 2 60 - 89   Stage 3 30 - 59   Stage 4 15 - 29   Stage 5 </= 14 or < 15  ** GFR is reliable for adults 17 to 69 years with stable kidney   function.  CALCIUM 9.1 8.4-10.2 mg/dL  CBC W/DIFF RAFFY  WBC 5.4 4.5-10.8 K/cmm  RBC 4.24 3.90-5.40 M/cmm  HEMOGLOBIN 13.0 12.0-16.0 g/dL  HEMATOCRIT 40.5 36.0-48.0 %  MCV 95.4 80.0-100.0 fL  MCH 30.5 26.0-35.0 pg  MCHC 32.0 31.0-36.5 g/dL  RDW 15.8 11.0-16.0 %  PLATELET 180 150-450 K/cmm  MPV 7.7 6.5-12.0 fL  NEUTROPHILS 43.3 40.0-80.0 %  LYMPHS 43.9 13.0-48.0 %  MONOCYTES 9.8 2.0-12.0 %  EOS 2.6 0.0-8.0 %  BASO 0.4 0.0-2.0 %  NEUTS (ABSOLUTE) 2.40 1.50-7.60 K/uL      Assessment/Plan   Problem List Items Addressed This Visit             ICD-10-CM    DM2 (diabetes mellitus, type 2) (CMS/HCC) - Primary (Chronic) E11.9     Continue metformin bid  Blood sugars remain stable  Last A1c level in octber of 6. 1  Continue to follow         Hypothyroidism (Chronic) E03.9     Remains on levothyroxine as ordered  Monitor labs and  will adjust prn         Neurogenic bladder N31.9     Remains on mirabegron  Monitor for s/s         Vitamin D deficiency E55.9     Continue n vitamin d as ordered  Level checked yearly         Benign essential tremor G25.0    Depression F32.A     Continue to monitor for s/s         Anemia (Chronic) D64.9     Will continue to monitor for labs

## 2024-01-04 ENCOUNTER — NURSING HOME VISIT (OUTPATIENT)
Dept: POST ACUTE CARE | Facility: EXTERNAL LOCATION | Age: 89
End: 2024-01-04
Payer: MEDICARE

## 2024-01-04 DIAGNOSIS — F33.9 MAJOR DEPRESSION, RECURRENT, CHRONIC (CMS-HCC): ICD-10-CM

## 2024-01-04 DIAGNOSIS — N31.9 NEUROGENIC BLADDER: ICD-10-CM

## 2024-01-04 DIAGNOSIS — R05.1 ACUTE COUGH: Primary | ICD-10-CM

## 2024-01-04 DIAGNOSIS — G25.0 BENIGN ESSENTIAL TREMOR: ICD-10-CM

## 2024-01-04 PROCEDURE — 99309 SBSQ NF CARE MODERATE MDM 30: CPT | Performed by: PHYSICIAN ASSISTANT

## 2024-01-04 NOTE — LETTER
Patient: Maria Luz Martinez  : 1935    Encounter Date: 2024  Name: Maria Luz Martinez  YOB: 1935    Chief complaint: Worsening cough.    Patient is visiting with her  and daughter today. They are concern that patient's cough is not getting better. Patient has been receiving Robitussin DM and Duo Neb aerosols with some improvement. COVID testing has been negative. Chest X-ray last week did not show any acute pathology. Cough is non productive. No wheezing note on exam. Pulse oximetry 92 % on room air.       Cough  This is a new problem. The current episode started in the past 7 days. The problem has been unchanged. The problem occurs hourly. The cough is Non-productive. Pertinent negatives include no chest pain, chills, ear congestion, fever, headaches, nasal congestion or sore throat. Nothing aggravates the symptoms. She has tried ipratropium inhaler, a beta-agonist inhaler and prescription cough suppressant for the symptoms. The treatment provided mild relief.     Review of systems:   ROS negative except were noted in HPI.    Code Status: DNR-CC    /80   Pulse 78   Temp 36.7 °C (98 °F)   Resp 16   Ht 1.524 m (5')   Wt 69.4 kg (153 lb)   SpO2 92%   BMI 29.88 kg/m²      Physical Exam  Constitutional:       General: She is not in acute distress.  HENT:      Head: Normocephalic.      Nose: Nose normal. No congestion.      Mouth/Throat:      Mouth: Mucous membranes are moist.   Eyes:      Comments: L eye enucleation    Cardiovascular:      Rate and Rhythm: Normal rate and regular rhythm.      Pulses: Normal pulses.   Pulmonary:      Effort: Pulmonary effort is normal.      Breath sounds: Normal breath sounds.   Abdominal:      General: Bowel sounds are normal.      Palpations: Abdomen is soft.      Tenderness: There is no abdominal tenderness. There is no guarding.   Genitourinary:     Comments: Voiding   Musculoskeletal:         General: Normal range of motion.       Cervical back: Normal range of motion.   Lymphadenopathy:      Cervical: No cervical adenopathy.   Skin:     General: Skin is warm and dry.      Capillary Refill: Capillary refill takes less than 2 seconds.   Neurological:      General: No focal deficit present.      Mental Status: She is alert and oriented to person, place, and time.   Psychiatric:         Mood and Affect: Mood normal.         Behavior: Behavior normal.        Medications reviewed during visit at facility.  Synthroid 75 mcg po daily  Preser Vision AREDS one capsule po daily  Tylenol 650 mg po q 4 hours prn  Mirabegron ER 50 mg po daily  Metformin 500 mg po bid  Miralax 17 grams po daily  Biotin 1000 mcg po daily  MOM 30 ml po daily prn  Simvastatin 40 mg po daily  Gabapentin 100 mg po daily  Cymbalta 60 mg po daily  Oxycodone 7.5/325 mg po q 6 hours prn  Aspirin 81 mg po daily.  Vitamin D 3 2000 international units po daily  Primidone 50 mg 2.5 tablets po hs  Primidone 50 mg 1.5 tablets po daily  Ropinirole 3 mg po daily  Trazodone 25 mg po q hs  Mirtazapine 7.5 mg po daily  Hydroxyzine 50 mg po q 12 hours prn  Ipratropium-Albuterol Solution 0.5-2.5 (3) MG/3ML unit dose q 6 hours.    Labs reviewed at facility:    Assessment/Plan   Problem List Items Addressed This Visit       Neurogenic bladder     Mirabegron ER 50 mg po daily         Benign essential tremor     Primidone helping. Followed by Dr. Tafoya of neurology.         Major depression, recurrent, chronic (CMS/HCC)     Cymbalta 60 mg po daily         Acute cough - Primary     Order Chest X -ray, BMP, CBC with diff, Influenza A&B, and Robitussin DM.             Time:    Jad Milan PA-C       Electronically Signed By: Jad Milan PA-C   1/6/24  9:02 PM

## 2024-01-06 VITALS
BODY MASS INDEX: 30.04 KG/M2 | RESPIRATION RATE: 16 BRPM | DIASTOLIC BLOOD PRESSURE: 80 MMHG | SYSTOLIC BLOOD PRESSURE: 116 MMHG | HEIGHT: 60 IN | TEMPERATURE: 98 F | WEIGHT: 153 LBS | OXYGEN SATURATION: 92 % | HEART RATE: 78 BPM

## 2024-01-06 PROBLEM — R05.1 ACUTE COUGH: Status: ACTIVE | Noted: 2024-01-06

## 2024-01-06 ASSESSMENT — ENCOUNTER SYMPTOMS
CHILLS: 0
SORE THROAT: 0
HEADACHES: 0
COUGH: 1
FEVER: 0

## 2024-01-07 NOTE — PROGRESS NOTES
1/4/2024  Name: Maria Luz Martinez  YOB: 1935    Chief complaint: Worsening cough.    Patient is visiting with her  and daughter today. They are concern that patient's cough is not getting better. Patient has been receiving Robitussin DM and Duo Neb aerosols with some improvement. COVID testing has been negative. Chest X-ray last week did not show any acute pathology. Cough is non productive. No wheezing note on exam. Pulse oximetry 92 % on room air.       Cough  This is a new problem. The current episode started in the past 7 days. The problem has been unchanged. The problem occurs hourly. The cough is Non-productive. Pertinent negatives include no chest pain, chills, ear congestion, fever, headaches, nasal congestion or sore throat. Nothing aggravates the symptoms. She has tried ipratropium inhaler, a beta-agonist inhaler and prescription cough suppressant for the symptoms. The treatment provided mild relief.     Review of systems:   ROS negative except were noted in HPI.    Code Status: DNR-CC    /80   Pulse 78   Temp 36.7 °C (98 °F)   Resp 16   Ht 1.524 m (5')   Wt 69.4 kg (153 lb)   SpO2 92%   BMI 29.88 kg/m²      Physical Exam  Constitutional:       General: She is not in acute distress.  HENT:      Head: Normocephalic.      Nose: Nose normal. No congestion.      Mouth/Throat:      Mouth: Mucous membranes are moist.   Eyes:      Comments: L eye enucleation    Cardiovascular:      Rate and Rhythm: Normal rate and regular rhythm.      Pulses: Normal pulses.   Pulmonary:      Effort: Pulmonary effort is normal.      Breath sounds: Normal breath sounds.   Abdominal:      General: Bowel sounds are normal.      Palpations: Abdomen is soft.      Tenderness: There is no abdominal tenderness. There is no guarding.   Genitourinary:     Comments: Voiding   Musculoskeletal:         General: Normal range of motion.      Cervical back: Normal range of motion.   Lymphadenopathy:      Cervical: No  cervical adenopathy.   Skin:     General: Skin is warm and dry.      Capillary Refill: Capillary refill takes less than 2 seconds.   Neurological:      General: No focal deficit present.      Mental Status: She is alert and oriented to person, place, and time.   Psychiatric:         Mood and Affect: Mood normal.         Behavior: Behavior normal.        Medications reviewed during visit at facility.  Synthroid 75 mcg po daily  Preser Vision AREDS one capsule po daily  Tylenol 650 mg po q 4 hours prn  Mirabegron ER 50 mg po daily  Metformin 500 mg po bid  Miralax 17 grams po daily  Biotin 1000 mcg po daily  MOM 30 ml po daily prn  Simvastatin 40 mg po daily  Gabapentin 100 mg po daily  Cymbalta 60 mg po daily  Oxycodone 7.5/325 mg po q 6 hours prn  Aspirin 81 mg po daily.  Vitamin D 3 2000 international units po daily  Primidone 50 mg 2.5 tablets po hs  Primidone 50 mg 1.5 tablets po daily  Ropinirole 3 mg po daily  Trazodone 25 mg po q hs  Mirtazapine 7.5 mg po daily  Hydroxyzine 50 mg po q 12 hours prn  Ipratropium-Albuterol Solution 0.5-2.5 (3) MG/3ML unit dose q 6 hours.    Labs reviewed at facility:    Assessment/Plan    Problem List Items Addressed This Visit       Neurogenic bladder     Mirabegron ER 50 mg po daily         Benign essential tremor     Primidone helping. Followed by Dr. Tafoya of neurology.         Major depression, recurrent, chronic (CMS/HCC)     Cymbalta 60 mg po daily         Acute cough - Primary     Order Chest X -ray, BMP, CBC with diff, Influenza A&B, and Robitussin DM.             Time:    Jad Milan PA-C

## 2024-01-11 ENCOUNTER — NURSING HOME VISIT (OUTPATIENT)
Dept: POST ACUTE CARE | Facility: EXTERNAL LOCATION | Age: 89
End: 2024-01-11
Payer: MEDICARE

## 2024-01-11 DIAGNOSIS — G62.9 NEUROPATHY: ICD-10-CM

## 2024-01-11 DIAGNOSIS — E11.40 TYPE 2 DIABETES MELLITUS WITH DIABETIC NEUROPATHY, UNSPECIFIED WHETHER LONG TERM INSULIN USE (MULTI): Chronic | ICD-10-CM

## 2024-01-11 DIAGNOSIS — F01.518 VASCULAR DEMENTIA WITH BEHAVIORAL DISTURBANCE (MULTI): Primary | Chronic | ICD-10-CM

## 2024-01-11 DIAGNOSIS — G25.0 BENIGN ESSENTIAL TREMOR: ICD-10-CM

## 2024-01-11 PROCEDURE — 99308 SBSQ NF CARE LOW MDM 20: CPT | Performed by: STUDENT IN AN ORGANIZED HEALTH CARE EDUCATION/TRAINING PROGRAM

## 2024-01-11 NOTE — PROGRESS NOTES
Chief Complaint:  Follow up visit    HPI:  Patient has no new complaints at this time.     ROS:  12-pt ROS was reviewed with patient and was negative, unless otherwise noted in HPI.    PMHx; SocHx; FamHx; Allergies; Medications: all reviewed, see PA notes, EMR, and records for further details.    LABS: available labs were reviewed    VITALS: vitals reviewed, see EMR for further details    PHYSICAL EXAM:  GEN: calm, no acute distress  HEENT: PER, EOMI, MMM  NECK: supple  CV: S1, S2, RRR  PULM: unlabored breathing, CTAB  ABD: soft, NT  Neuro: no new gross focal deficits  PSYCH: appropriate affect    ASSESSMENT:  Vascular dementia w/ behavioral disturbance  RLS  Benign essential tremor  Chronic pain  Neuropathy  DM2     PLAN:  PT/OT/ST as indicated.  Prior records and hospital notes reviewed.  I agree with plan of care as detailed in PA note.  Fall precautions.  I discussed case with nursing staff.    Harley Beckman DO   Resident PGY-3    Trainee role: Resident    I saw and evaluated the patient. I personally obtained the key and critical portions of the history and physical exam or was physically present for key and critical portions performed by the trainee. I reviewed the trainee's documentation and discussed the patient with the trainee. I agree with the trainee's medical decision making as documented on the trainee's notes.    Jerry Torres M.D.

## 2024-01-11 NOTE — LETTER
Patient: Maria Luz Martinez  : 1935    Encounter Date: 2024    Chief Complaint:  Follow up visit    HPI:  Patient has no new complaints at this time.     ROS:  12-pt ROS was reviewed with patient and was negative, unless otherwise noted in HPI.    PMHx; SocHx; FamHx; Allergies; Medications: all reviewed, see PA notes, EMR, and records for further details.    LABS: available labs were reviewed    VITALS: vitals reviewed, see EMR for further details    PHYSICAL EXAM:  GEN: calm, no acute distress  HEENT: PER, EOMI, MMM  NECK: supple  CV: S1, S2, RRR  PULM: unlabored breathing, CTAB  ABD: soft, NT  Neuro: no new gross focal deficits  PSYCH: appropriate affect    ASSESSMENT:  Vascular dementia w/ behavioral disturbance  RLS  Benign essential tremor  Chronic pain  Neuropathy  DM2     PLAN:  PT/OT/ST as indicated.  Prior records and hospital notes reviewed.  I agree with plan of care as detailed in PA note.  Fall precautions.  I discussed case with nursing staff.    Harley Beckman DO   Resident PGY-3    Trainee role: Resident    I saw and evaluated the patient. I personally obtained the key and critical portions of the history and physical exam or was physically present for key and critical portions performed by the trainee. I reviewed the trainee's documentation and discussed the patient with the trainee. I agree with the trainee's medical decision making as documented on the trainee's notes.    Jerry Torres M.D.        Electronically Signed By: Jerry Torres MD   24 12:29 PM

## 2024-02-22 ENCOUNTER — NURSING HOME VISIT (OUTPATIENT)
Dept: POST ACUTE CARE | Facility: EXTERNAL LOCATION | Age: 89
End: 2024-02-22
Payer: MEDICARE

## 2024-02-22 DIAGNOSIS — F33.9 MAJOR DEPRESSION, RECURRENT, CHRONIC (CMS-HCC): ICD-10-CM

## 2024-02-22 DIAGNOSIS — N31.9 NEUROGENIC BLADDER: ICD-10-CM

## 2024-02-22 DIAGNOSIS — N30.00 ACUTE CYSTITIS WITHOUT HEMATURIA: Primary | ICD-10-CM

## 2024-02-22 DIAGNOSIS — E11.40 TYPE 2 DIABETES MELLITUS WITH DIABETIC NEUROPATHY, UNSPECIFIED WHETHER LONG TERM INSULIN USE (MULTI): Chronic | ICD-10-CM

## 2024-02-22 PROCEDURE — 99309 SBSQ NF CARE MODERATE MDM 30: CPT | Performed by: PHYSICIAN ASSISTANT

## 2024-02-22 NOTE — LETTER
Patient: Maria Luz Martinez  : 1935    Encounter Date: 2024  Name: Maria Luz Martinez  YOB: 1935    Chief complaint: Dysuria.    HPI: Patient had complaint of dysuria and urine was obtained for UA/CS. Urine culture positive for E. Coli, and Klebsiella pneumoniae. She will be started on antibiotic course.    UTI   This is a new problem. The current episode started yesterday. The problem has been unchanged. The quality of the pain is described as burning. The pain is mild. There has been no fever. She is Not sexually active. There is No history of pyelonephritis. Pertinent negatives include no chills, discharge, flank pain or hematuria. She has tried antibiotics for the symptoms. The treatment provided moderate relief.     Review of systems:   ROS negative except were noted in HPI.    Code Status: DNR-CC    /74   Pulse 78   Temp 36.7 °C (98 °F)   Resp 18   Ht 1.524 m (5')   Wt 68.9 kg (152 lb)   SpO2 97%   BMI 29.69 kg/m²      Physical Exam  Constitutional:       General: She is not in acute distress.  HENT:      Head: Normocephalic.      Nose: Nose normal. No congestion.      Mouth/Throat:      Mouth: Mucous membranes are moist.   Eyes:      Comments: L eye enucleation    Cardiovascular:      Rate and Rhythm: Normal rate and regular rhythm.      Pulses: Normal pulses.   Pulmonary:      Effort: Pulmonary effort is normal.      Breath sounds: Normal breath sounds.   Abdominal:      General: Bowel sounds are normal.      Palpations: Abdomen is soft.      Tenderness: There is no abdominal tenderness. There is no guarding.   Genitourinary:     Comments: Voiding   Musculoskeletal:         General: Normal range of motion.      Cervical back: Normal range of motion.   Lymphadenopathy:      Cervical: No cervical adenopathy.   Skin:     General: Skin is warm and dry.      Capillary Refill: Capillary refill takes less than 2 seconds.   Neurological:      General: No focal deficit  present.      Mental Status: She is alert and oriented to person, place, and time.   Psychiatric:         Mood and Affect: Mood normal.         Behavior: Behavior normal.     Medications reviewed during visit at facility.  Synthroid 75 mcg po daily  Preser Vision AREDS one capsule po daily  Tylenol 650 mg po q 4 hours prn  Mirabegron ER 50 mg po daily  Metformin 500 mg po bid  Miralax 17 grams po daily  Biotin 1000 mcg po daily  MOM 30 ml po daily prn  Simvastatin 40 mg po daily  Gabapentin 100 mg po daily  Cymbalta 60 mg po daily  Oxycodone 7.5/325 mg po q 6 hours prn  Aspirin 81 mg po daily.  Vitamin D 3 2000 international units po daily  Primidone 50 mg 2.5 tablets po hs  Primidone 50 mg 1.5 tablets po daily  Ropinirole 3 mg po daily  Trazodone 25 mg po q hs  Mirtazapine 7.5 mg po daily  Hydroxyzine 50 mg po q 12 hours prn  Ipratropium-Albuterol Solution 0.5-2.5 (3) MG/3ML unit dose q 6 hours.  Labs reviewed at facility:    Laboratory: 2024 10:02 URINALYSIS / CULTURE, URINE  Latest Version (more available)  Resident Information  Resident: Maria Luz Rodgers ([id number])  Admit Date: 2021  Admitting Provider:   Attending Provider:   Copy to List:   Report Information  Collection Date: 2024 00:00  Received Date: 2024 09:13  Reported Date: 2024 10:02  Ord. Provider: Jerry Torres  Source [place]: [id number]  Lab Information  Status: Completed  Flag:    Reporting Lab:   [facility]  Order #:   [id number]  Vendor Order #:   [id number]  Category:   Chemistry, Unknown Category  Order Notes  Result for:  MARIA LUZ RODGERS  ( 1935, F)         Results   Show All Details Result Units Ref. Range Flag Status   URINALYSIS                         Grading for Epith Cells,Bact,Cast                                NEGATIVE  = 0  - 2                                  FEW       = 3  - 5                                 MODERATE  = 6 -  20                                  MANY      = 21 - 50                                  TNTC      = >50       COLOR  YELLOW  YELLOW  Final           CLARITY  TURBID  CLEAR A Final           GLUCOSE,UR  NEGATIVE  NEGATIVE  Final           BILIRUBIN,UR  NEGATIVE  NEGATIVE  Final           KETONES,UR  NEGATIVE  NEGATIVE  Final           SPECIFIC GRAVITY  1.014  1.001-1.030  Final           BLOOD,UR  NEGATIVE  NEGATIVE  Final            PH, URINE  5.5  5.0-8.5  Final           PROTEIN,UR  NEGATIVE  NEGATIVE  Final           UROBILINOGEN,UR  NEGATIVE  NEGATIVE  Final           NITRITE,UR  POSITIVE  NEGATIVE A Final           LEUKOCYTES,UR  4+  NEGATIVE A Final           RBC,UR  3-5 /HPF <6  Final           WBC,UR  >50 /HPF <6 A Final           EPITHELIAL CELL  FEW  NEGATIVE A Final           BACTERIA,UR  FEW  NEGATIVE A Final           WBC CLUMPS  PRESENT  ABSENT A Final       CULTURE, URINE  .    Final                          (Final)                      Source: URINE                      >100,000 CFU/mL ESCHERICHIA COLI                      >100,000 CFU/mL KLEBSIELLA PNEUMONIAE                      Sensitivity                            E. COLI        K. PNEUMONIAE    AMIKACIN            S <=16                                    AMP/SULBACTAM       I 16/8              S <=8/4               AMPICILLIN          R >16               R >16                 BACTRIM (TRIMETH/S  S <=2/38            S <=2/38              CEFAZOLIN           S 4                 S <=2                 CEFTRIAXONE         S <=1               S <=1                 CIPROFLOXICIN       R >2                S <=1                 GENTAMICIN          R >8                S <=4                 LEVOFLOXACIN        R >4                S <=2                 NITROFURANTOIN      S <=32              R >64                 PIPERACILLIN/TAZOB  S <=16              S <=16                TETRACYCLINE        S <=4               S <=4                 TOBRAMYCIN          S <=4               S <=4  Assessment/Plan   Problem List Items Addressed This  Visit       DM2 (diabetes mellitus, type 2) (CMS/HCC) (Chronic)     Review blood sugar readings. Blood sugar today 135. Continue with Metformin 500 mg po bid.         Neurogenic bladder     Mirabegron ER 50 mg po daily         Major depression, recurrent, chronic (CMS/HCC)     Cymbalta 60 mg po daily         Acute cystitis without hematuria - Primary     Start Bactrim DS. Already on probiotic.            Time:    Jad Milan PA-C       Electronically Signed By: Jad Milan PA-C   2/25/24 10:54 AM

## 2024-02-25 VITALS
DIASTOLIC BLOOD PRESSURE: 74 MMHG | HEART RATE: 78 BPM | TEMPERATURE: 98 F | RESPIRATION RATE: 18 BRPM | BODY MASS INDEX: 29.84 KG/M2 | SYSTOLIC BLOOD PRESSURE: 132 MMHG | OXYGEN SATURATION: 97 % | HEIGHT: 60 IN | WEIGHT: 152 LBS

## 2024-02-25 PROBLEM — N30.00 ACUTE CYSTITIS WITHOUT HEMATURIA: Status: ACTIVE | Noted: 2024-02-25

## 2024-02-25 ASSESSMENT — ENCOUNTER SYMPTOMS
FLANK PAIN: 0
HEMATURIA: 0
CHILLS: 0

## 2024-02-25 NOTE — PROGRESS NOTES
2/22/2024  Name: Maria Luz Martinez  YOB: 1935    Chief complaint: Dysuria.    HPI: Patient had complaint of dysuria and urine was obtained for UA/CS. Urine culture positive for E. Coli, and Klebsiella pneumoniae. She will be started on antibiotic course.    UTI   This is a new problem. The current episode started yesterday. The problem has been unchanged. The quality of the pain is described as burning. The pain is mild. There has been no fever. She is Not sexually active. There is No history of pyelonephritis. Pertinent negatives include no chills, discharge, flank pain or hematuria. She has tried antibiotics for the symptoms. The treatment provided moderate relief.     Review of systems:   ROS negative except were noted in HPI.    Code Status: DNR-CC    /74   Pulse 78   Temp 36.7 °C (98 °F)   Resp 18   Ht 1.524 m (5')   Wt 68.9 kg (152 lb)   SpO2 97%   BMI 29.69 kg/m²      Physical Exam  Constitutional:       General: She is not in acute distress.  HENT:      Head: Normocephalic.      Nose: Nose normal. No congestion.      Mouth/Throat:      Mouth: Mucous membranes are moist.   Eyes:      Comments: L eye enucleation    Cardiovascular:      Rate and Rhythm: Normal rate and regular rhythm.      Pulses: Normal pulses.   Pulmonary:      Effort: Pulmonary effort is normal.      Breath sounds: Normal breath sounds.   Abdominal:      General: Bowel sounds are normal.      Palpations: Abdomen is soft.      Tenderness: There is no abdominal tenderness. There is no guarding.   Genitourinary:     Comments: Voiding   Musculoskeletal:         General: Normal range of motion.      Cervical back: Normal range of motion.   Lymphadenopathy:      Cervical: No cervical adenopathy.   Skin:     General: Skin is warm and dry.      Capillary Refill: Capillary refill takes less than 2 seconds.   Neurological:      General: No focal deficit present.      Mental Status: She is alert and oriented to person, place, and  time.   Psychiatric:         Mood and Affect: Mood normal.         Behavior: Behavior normal.     Medications reviewed during visit at facility.  Synthroid 75 mcg po daily  Preser Vision AREDS one capsule po daily  Tylenol 650 mg po q 4 hours prn  Mirabegron ER 50 mg po daily  Metformin 500 mg po bid  Miralax 17 grams po daily  Biotin 1000 mcg po daily  MOM 30 ml po daily prn  Simvastatin 40 mg po daily  Gabapentin 100 mg po daily  Cymbalta 60 mg po daily  Oxycodone 7.5/325 mg po q 6 hours prn  Aspirin 81 mg po daily.  Vitamin D 3 2000 international units po daily  Primidone 50 mg 2.5 tablets po hs  Primidone 50 mg 1.5 tablets po daily  Ropinirole 3 mg po daily  Trazodone 25 mg po q hs  Mirtazapine 7.5 mg po daily  Hydroxyzine 50 mg po q 12 hours prn  Ipratropium-Albuterol Solution 0.5-2.5 (3) MG/3ML unit dose q 6 hours.  Labs reviewed at facility:    Laboratory: 2024 10:02 URINALYSIS / CULTURE, URINE  Latest Version (more available)  Resident Information  Resident: Maria Luz Rodgers ([id number] NF)  Admit Date: 2021  Admitting Provider:   Attending Provider:   Copy to List:   Report Information  Collection Date: 2024 00:00  Received Date: 2024 09:13  Reported Date: 2024 10:02  Ord. Provider: Jerry Torres  Source [place]: [id number]  Lab Information  Status: Completed  Flag:    Reporting Lab:   [facility]  Order #:   [id number]  Vendor Order #:   [id number]  Category:   Chemistry, Unknown Category  Order Notes  Result for:  MARIA LUZ RODGERS  ( 1935, F)         Results   Show All Details Result Units Ref. Range Flag Status   URINALYSIS                         Grading for Epith Cells,Bact,Cast                                NEGATIVE  = 0  - 2                                  FEW       = 3  - 5                                 MODERATE  = 6 -  20                                  MANY      = 21 - 50                                 TNTC      = >50       COLOR  YELLOW  YELLOW  Final            CLARITY  TURBID  CLEAR A Final           GLUCOSE,UR  NEGATIVE  NEGATIVE  Final           BILIRUBIN,UR  NEGATIVE  NEGATIVE  Final           KETONES,UR  NEGATIVE  NEGATIVE  Final           SPECIFIC GRAVITY  1.014  1.001-1.030  Final           BLOOD,UR  NEGATIVE  NEGATIVE  Final            PH, URINE  5.5  5.0-8.5  Final           PROTEIN,UR  NEGATIVE  NEGATIVE  Final           UROBILINOGEN,UR  NEGATIVE  NEGATIVE  Final           NITRITE,UR  POSITIVE  NEGATIVE A Final           LEUKOCYTES,UR  4+  NEGATIVE A Final           RBC,UR  3-5 /HPF <6  Final           WBC,UR  >50 /HPF <6 A Final           EPITHELIAL CELL  FEW  NEGATIVE A Final           BACTERIA,UR  FEW  NEGATIVE A Final           WBC CLUMPS  PRESENT  ABSENT A Final       CULTURE, URINE  .    Final                          (Final)                      Source: URINE                      >100,000 CFU/mL ESCHERICHIA COLI                      >100,000 CFU/mL KLEBSIELLA PNEUMONIAE                      Sensitivity                            E. COLI        K. PNEUMONIAE    AMIKACIN            S <=16                                    AMP/SULBACTAM       I 16/8              S <=8/4               AMPICILLIN          R >16               R >16                 BACTRIM (TRIMETH/S  S <=2/38            S <=2/38              CEFAZOLIN           S 4                 S <=2                 CEFTRIAXONE         S <=1               S <=1                 CIPROFLOXICIN       R >2                S <=1                 GENTAMICIN          R >8                S <=4                 LEVOFLOXACIN        R >4                S <=2                 NITROFURANTOIN      S <=32              R >64                 PIPERACILLIN/TAZOB  S <=16              S <=16                TETRACYCLINE        S <=4               S <=4                 TOBRAMYCIN          S <=4               S <=4  Assessment/Plan    Problem List Items Addressed This Visit       DM2 (diabetes mellitus, type 2) (CMS/[facility]) (Chronic)      Review blood sugar readings. Blood sugar today 135. Continue with Metformin 500 mg po bid.         Neurogenic bladder     Mirabegron ER 50 mg po daily         Major depression, recurrent, chronic (CMS/HCC)     Cymbalta 60 mg po daily         Acute cystitis without hematuria - Primary     Start Bactrim DS. Already on probiotic.            Time:    Jad Milan PA-C

## 2024-04-15 ENCOUNTER — NURSING HOME VISIT (OUTPATIENT)
Dept: POST ACUTE CARE | Facility: EXTERNAL LOCATION | Age: 89
End: 2024-04-15
Payer: MEDICARE

## 2024-04-15 DIAGNOSIS — F01.518 VASCULAR DEMENTIA WITH BEHAVIORAL DISTURBANCE (MULTI): ICD-10-CM

## 2024-04-15 DIAGNOSIS — N31.9 NEUROGENIC BLADDER: ICD-10-CM

## 2024-04-15 DIAGNOSIS — E11.40 TYPE 2 DIABETES MELLITUS WITH DIABETIC NEUROPATHY, UNSPECIFIED WHETHER LONG TERM INSULIN USE (MULTI): Primary | ICD-10-CM

## 2024-04-15 PROCEDURE — 99308 SBSQ NF CARE LOW MDM 20: CPT | Performed by: STUDENT IN AN ORGANIZED HEALTH CARE EDUCATION/TRAINING PROGRAM

## 2024-04-15 NOTE — PROGRESS NOTES
Chief Complaint:  Follow up visit    HPI:  Patient has no new complaints at this time.     ROS:  12-pt ROS was reviewed with patient and was negative, unless otherwise noted in HPI.    PMHx; SocHx; FamHx; Allergies; Medications: all reviewed, see PA notes, EMR, and records for further details.    LABS: available labs were reviewed    VITALS: vitals reviewed, see EMR for further details    PHYSICAL EXAM:  GEN: calm, no acute distress  HEENT: normocephalic, MMM  NECK: supple  CV: S1, S2, RRR  PULM: unlabored breathing, CTAB  ABD: soft, NT  Neuro: no new gross focal deficits  PSYCH: appropriate affect    ASSESSMENT:  Vascular dementia w/ behavioral disturbance  RLS  Benign essential tremor  Chronic pain  Neuropathy  DM2     PLAN:  PT/OT/ST as indicated.  Prior records and hospital notes reviewed.  I agree with plan of care as detailed in PA note.  Fall precautions.  I discussed case with nursing staff.    Chantel Novak DO   Resident PGY-3    Trainee role: Resident    I saw and evaluated the patient. I personally obtained the key and critical portions of the history and physical exam or was physically present for key and critical portions performed by the trainee. I reviewed the trainee's documentation and discussed the patient with the trainee. I agree with the trainee's medical decision making as documented on the trainee's notes.    Jerry Torres M.D.

## 2024-04-15 NOTE — LETTER
Patient: Maria Luz Martinez  : 1935    Encounter Date: 04/15/2024    Chief Complaint:  Follow up visit    HPI:  Patient has no new complaints at this time.     ROS:  12-pt ROS was reviewed with patient and was negative, unless otherwise noted in HPI.    PMHx; SocHx; FamHx; Allergies; Medications: all reviewed, see PA notes, EMR, and records for further details.    LABS: available labs were reviewed    VITALS: vitals reviewed, see EMR for further details    PHYSICAL EXAM:  GEN: calm, no acute distress  HEENT: normocephalic, MMM  NECK: supple  CV: S1, S2, RRR  PULM: unlabored breathing, CTAB  ABD: soft, NT  Neuro: no new gross focal deficits  PSYCH: appropriate affect    ASSESSMENT:  Vascular dementia w/ behavioral disturbance  RLS  Benign essential tremor  Chronic pain  Neuropathy  DM2     PLAN:  PT/OT/ST as indicated.  Prior records and hospital notes reviewed.  I agree with plan of care as detailed in PA note.  Fall precautions.  I discussed case with nursing staff.    Chantel Novak DO   Resident PGY-3    Trainee role: Resident    I saw and evaluated the patient. I personally obtained the key and critical portions of the history and physical exam or was physically present for key and critical portions performed by the trainee. I reviewed the trainee's documentation and discussed the patient with the trainee. I agree with the trainee's medical decision making as documented on the trainee's notes.    Jerry Torres M.D.        Electronically Signed By: Jerry Torres MD   24  7:50 PM

## 2024-04-16 ENCOUNTER — APPOINTMENT (OUTPATIENT)
Dept: NEUROLOGY | Facility: CLINIC | Age: 89
End: 2024-04-16
Payer: MEDICARE

## 2024-05-02 ENCOUNTER — NURSING HOME VISIT (OUTPATIENT)
Dept: POST ACUTE CARE | Facility: EXTERNAL LOCATION | Age: 89
End: 2024-05-02
Payer: MEDICARE

## 2024-05-02 DIAGNOSIS — G25.81 RESTLESS LEG SYNDROME: ICD-10-CM

## 2024-05-02 DIAGNOSIS — F33.9 MAJOR DEPRESSION, RECURRENT, CHRONIC (CMS-HCC): ICD-10-CM

## 2024-05-02 DIAGNOSIS — G25.0 BENIGN ESSENTIAL TREMOR: ICD-10-CM

## 2024-05-02 DIAGNOSIS — F41.1 GENERALIZED ANXIETY DISORDER: Primary | ICD-10-CM

## 2024-05-02 PROCEDURE — 99308 SBSQ NF CARE LOW MDM 20: CPT | Performed by: PHYSICIAN ASSISTANT

## 2024-05-02 NOTE — LETTER
Patient: Maria Luz Martinez  : 1935    Encounter Date: 2024  Name: Maria Luz Martinez  YOB: 1935    Chief complaint: Follow up for anxiety/ itching    HPI: Patient seen and examined in her room. Nursing reports patient has been less anxious and not complaining of itching with use of Hydroxyzine. Patient is also receiving treatment for restless leg syndrome and essential tremors.     Anxiety  Presents for follow-up visit. Patient reports no chest pain, confusion, depressed mood, dizziness, insomnia, irritability, nervous/anxious behavior, palpitations, restlessness or shortness of breath.         Review of systems:   ROS negative except were noted in HPI.    Code Status: DNR-CC    /62   Pulse 61   Temp 36.6 °C (97.9 °F)   Ht 1.524 m (5')   Wt 68.5 kg (151 lb)   BMI 29.49 kg/m²      Physical Exam  Constitutional:       General: She is not in acute distress.  HENT:      Head: Normocephalic.      Nose: Nose normal. No congestion.      Mouth/Throat:      Mouth: Mucous membranes are moist.   Eyes:      Comments: L eye enucleation    Cardiovascular:      Rate and Rhythm: Normal rate and regular rhythm.      Pulses: Normal pulses.   Pulmonary:      Effort: Pulmonary effort is normal.      Breath sounds: Normal breath sounds.   Abdominal:      General: Bowel sounds are normal.      Palpations: Abdomen is soft.      Tenderness: There is no abdominal tenderness. There is no guarding.   Genitourinary:     Comments: Voiding   Musculoskeletal:         General: Normal range of motion.      Cervical back: Normal range of motion.   Lymphadenopathy:      Cervical: No cervical adenopathy.   Skin:     General: Skin is warm and dry.      Capillary Refill: Capillary refill takes less than 2 seconds.   Neurological:      General: No focal deficit present.      Mental Status: She is alert and oriented to person, place, and time.   Psychiatric:         Mood and Affect: Mood normal.         Behavior:  Behavior normal.     Medications reviewed during visit at facility.  Synthroid 75 mcg po daily  Preser Vision AREDS one capsule po daily  Tylenol 650 mg po q 4 hours prn  Mirabegron ER 50 mg po daily  Metformin 500 mg po bid  Miralax 17 grams po daily  Biotin 1000 mcg po daily  MOM 30 ml po daily prn  Simvastatin 40 mg po daily  Gabapentin 100 mg po daily  Cymbalta 30 mg po daily  Oxycodone 7.5/325 mg po q 6 hours prn  Aspirin 81 mg po daily.  Vitamin D 3 2000 international units po daily  Primidone 50 mg 2.5 tablets po hs  Primidone 50 mg 1.5 tablets po daily  Ropinirole 3 mg po daily  Trazodone 25 mg po q hs  Mirtazapine 7.5 mg po daily  Hydroxyzine 50 mg po at 0800  Hydroxyzine 25 mg po daily prn.  Ipratropium-Albuterol Solution 0.5-2.5 (3) MG/3ML unit dose q 6 hours  Hydrocortisone External Cream 1 % (Hydrocortisone apply topically q 8 hours prn.  Labs reviewed at facility:    Laboratory: 4/3/2024 15:13 BASIC MET PNL INCL GFR (BMP) / GLYCO-HGBA1C / CBC W/DIFF / T3, TOTAL / TSH 3-UL / T4, FREE / HEMOGLOBIN A1C  Latest Version (more available)  Reviewed by steffanie on 4/3/2024 16:02  Resident Information  Resident: Maria Luz Rodgers ([id number] NF)  Admit Date: 2021  Admitting Provider:   Attending Provider:   Copy to List:   Report Information  Collection Date: 2024 09:15  Received Date: 2024 12:32  Reported Date: 4/3/2024 15:13  Ord. Provider: Thu Torres  Source [place]: [id number]  Lab Information  Status: Resulted  Flag:    Reporting Lab:   [facility]  Order #:   [id number]  Vendor Order #:   [id number]  Category:   Chemistry, Hematology, Unknown Category  Order Notes  Result for:  MARIA LUZ RODGERS  ( 1935, F)         Results   Show All Details Result Units Ref. Range Flag Status   BASIC MET PNL INCL GFR (BMP)       GLUCOSE  108 mg/dL  H Final      GLUCOSE, FASTING         65-99  mg/dL                               GLUCOSE, NON-FASTING      mg/dL       SODIUM  138 mEq/L 136-145  Final            POTASSIUM  4.1 mEq/L 3.5-5.3  Final           CHLORIDE  99 mEq/L   Final           CARBON DIOXIDE (CO2)  25 mEq/L 21-33  Final           BUN (UREA NITROGEN)  11 mg/dL 7-25  Final           CREATININE  0.5 mg/dL 0.6-1.2 L Final           BUN/CREATININE RATIO  22  6-22  Final           GFR-  141 mL/min/1.73 m2 >60  Final           GFR-NON-AFRICAN AMERICAN  116 mL/min/1.73 m2 >60  Final           Stage of CKD     eGFR (mL/min/1.73 square meters)          Stage 1        >/= 90        or > 90          Stage 2        60 - 89          Stage 3        30 - 59          Stage 4        15 - 29          Stage 5        </= 14        or < 15  ** GFR is reliable for adults 17 to 69 years with stable kidney      function.        CALCIUM  9.1 mg/dL 8.4-10.2  Final       GLYCO-HGBA1C       GLYCOHEMOGLOBIN-HGBA1C  PENDING    Pending           eAG (Mean Glucose)  PENDING    Pending       CBC W/DIFF       WBC  5.4 K/cmm 4.5-10.8  Final           RBC  4.38 M/cmm 3.90-5.40  Final           HEMOGLOBIN  13.8 g/dL 12.0-16.0  Final           HEMATOCRIT  41.6 % 36.0-48.0  Final           MCV  95.0 fL 80.0-100.0  Final           MCH  31.5 pg 26.0-35.0  Final           MCHC  33.2 g/dL 31.0-36.5  Final           RDW  15.1 % 11.0-16.0  Final           PLATELET  193 K/cmm 150-450  Final           MPV  7.6 fL 6.5-12.0  Final           NEUTROPHILS  49.5 % 40.0-80.0  Final           LYMPHS  35.7 % 13.0-48.0  Final           MONOCYTES  11.1 % 2.0-12.0  Final           EOS  3.0 % 0.0-8.0  Final           BASO  0.7 % 0.0-2.0  Final           NEUTS (ABSOLUTE)  2.70 K/uL 1.50-7.60  Final           LYMPHS (ABSOLUTE)  1.90 K/uL 0.90-5.50  Final           MONOCYTES (ABSOLUTE)  0.60 K/uL 0.15-1.10  Final           EOS (ABSOLUTE)  0.20 K/uL 0.20-0.80  Final           NUCLEATED RBC  0.2 NRBC/100 WBC <1.0  Final       T3, TOTAL  0.81 ng/mL 0.8-1.8  Final       TSH 3-UL  1.765 uIU/mL 0.340-5.500  Final       T4,  FREE  0.97 ng/dL 0.6-1.7  Final       HEMOGLOBIN A1C  5.3 % 5.7-6.5 L Final      5.7 -6.5  PREDIABETIC  Assessment/Plan   Problem List Items Addressed This Visit       Restless leg syndrome     Ropinirole 3 mg po daily         Benign essential tremor     Appointment with Dr. Tafoya of neurology tomorrow. Primidone 50 mg 2.5 tablets po hs. Primidone 50 mg 1.5 tablets po daily         Major depression, recurrent, chronic (CMS-HCC)     Cymbalta 30 mg po daily         Generalized anxiety disorder - Primary     Hydroxyzine 50 mg po daily. Hydroxyzine 25 mg po daily prn.            Time:    Jad Milan PA-C       Electronically Signed By: Jad Milan PA-C   5/5/24  6:15 PM

## 2024-05-03 ENCOUNTER — TELEMEDICINE (OUTPATIENT)
Dept: NEUROLOGY | Facility: CLINIC | Age: 89
End: 2024-05-03
Payer: MEDICARE

## 2024-05-03 VITALS — HEIGHT: 66 IN | WEIGHT: 157 LBS | BODY MASS INDEX: 25.23 KG/M2

## 2024-05-03 VITALS
BODY MASS INDEX: 29.64 KG/M2 | HEIGHT: 60 IN | SYSTOLIC BLOOD PRESSURE: 129 MMHG | DIASTOLIC BLOOD PRESSURE: 62 MMHG | TEMPERATURE: 97.9 F | WEIGHT: 151 LBS | HEART RATE: 61 BPM

## 2024-05-03 DIAGNOSIS — G25.0 BENIGN ESSENTIAL TREMOR: Primary | ICD-10-CM

## 2024-05-03 PROCEDURE — 1159F MED LIST DOCD IN RCRD: CPT | Performed by: PSYCHIATRY & NEUROLOGY

## 2024-05-03 PROCEDURE — 99442 PR PHYS/QHP TELEPHONE EVALUATION 11-20 MIN: CPT | Performed by: PSYCHIATRY & NEUROLOGY

## 2024-05-03 PROCEDURE — 1157F ADVNC CARE PLAN IN RCRD: CPT | Performed by: PSYCHIATRY & NEUROLOGY

## 2024-05-03 PROCEDURE — 1036F TOBACCO NON-USER: CPT | Performed by: PSYCHIATRY & NEUROLOGY

## 2024-05-03 RX ORDER — PRIMIDONE 250 MG/1
250 TABLET ORAL NIGHTLY
Qty: 90 TABLET | Refills: 3 | Status: SHIPPED | OUTPATIENT
Start: 2024-05-03

## 2024-05-03 RX ORDER — GABAPENTIN 100 MG/1
100 CAPSULE ORAL NIGHTLY
COMMUNITY

## 2024-05-03 RX ORDER — PRIMIDONE 50 MG/1
TABLET ORAL
Qty: 360 TABLET | Refills: 3 | Status: SHIPPED | OUTPATIENT
Start: 2024-05-03

## 2024-05-03 NOTE — PATIENT INSTRUCTIONS
"It was a pleasure seeing you today.   FAX to nursing facility at [phone]    Please get bloodwork    Plan to increase primidone by 1/2 tab daily each week from 100mg in the morning until taking 200mg in morning/ 250mg at night.    In order to do telemetry/virtual visits with you I will need to have seen you in person (in office) within 1 year of your virtual visit.  These guidelines and requirements began January 2024.    Medication refilled can be done if I see you within one year, otherwise your primary care doctor may neeto manage the medication refills.    If in person visit is unable to be done, your Primary care doctor will need to manage your medications.     For any urgent issues or needing to speak to a medical assistant please call [phone], option 6 during our office hours Monday-Friday 8am-4pm, and leave a voicemail with your concern.  My office will try to reach back you as soon as possible within 24 (business) hours.  If you have an emergency please call 911 or visit a local urgent care or nearest emergency room.      Please understand that [employer] is a useful communication tool for simple \"normal\" results or a refill request but I would not recommend using this tool for emergent or urgent issues or for conversations with me.  I am happy to ask my staff to rearrange a follow up visit or a virtual visit sooner than requested if appropriate for your care.    "

## 2024-05-03 NOTE — PROGRESS NOTES
Subjective     Maria Luz Martinez is a 88 y.o. year old female here for a virtual visit for tremor / myoclonus, RLS follow up.     HPI  Accompanied by her daughter today.  She feels her tremors are worse.  She cannot eat much due to severe tremors. She takes 100mg primidone in morning and 250mg at night.     RLS - on Ropinirole 3mg ( was lowered recently from 4mg due to sleepiness) and Lyrica. - RLS symptoms are stable. but she sleeps a lot.    Review of Systems    Patient Active Problem List   Diagnosis    Blindness of left eye    Cognitive dysfunction    DM2 (diabetes mellitus, type 2) (Multi)    Functional movement disorder    Fibromyalgia    Hypothyroidism    Neurogenic bladder    Poor balance    Restless leg syndrome    CVA (cerebral vascular accident) (Multi)    Vitamin D deficiency    Chronic UTI    Benign essential tremor    Depression    Vascular dementia with behavioral disturbance (Multi)    Neuropathy    Mild protein-calorie malnutrition (Multi)    Hypercholesteremia    Anemia    Aortic regurgitation    Abnormal EKG    Major depression, recurrent, chronic (CMS-HCC)    Acute cough    Acute cystitis without hematuria     Past Medical History:   Diagnosis Date    Abnormal EKG 10/17/2023    During anesthesia at [facility]    Anemia 10/17/2023    Aortic regurgitation 10/17/2023    Mild to mod in 2017    CVA (cerebral vascular accident) (Multi) 03/31/2023    2018    Diverticulitis of intestine, part unspecified, without perforation or abscess without bleeding     Diverticulitis    DM2 (diabetes mellitus, type 2) (Multi) 03/31/2023    Fracture, femur (Multi) 03/31/2023    Hypercholesteremia 10/17/2023    Hypothyroidism 03/31/2023    Personal history of other diseases of the musculoskeletal system and connective tissue     History of osteoarthritis    Personal history of other endocrine, nutritional and metabolic disease     History of hyperlipidemia    Personal history of transient ischemic attack (TIA), and cerebral  infarction without residual deficits 05/03/2021    H/O: CVA (cerebrovascular accident)    Vascular dementia with behavioral disturbance (Multi) 06/29/2023     Past Surgical History:   Procedure Laterality Date    CATARACT EXTRACTION  07/17/2014    Cataract Surgery    CHOLECYSTECTOMY  07/17/2014    Cholecystectomy    ENUCLEATION Left     HYSTERECTOMY  07/21/2014    Hysterectomy    TOTAL KNEE ARTHROPLASTY  07/17/2014    Knee Replacement     Social History     Tobacco Use    Smoking status: Never    Smokeless tobacco: Never   Substance Use Topics    Alcohol use: Never     family history includes Breast cancer in her sister; COPD in her mother; Ulcers in her sister; cervix uteri cancer in her sister.    Current Outpatient Medications:     aspirin 81 mg chewable tablet, Chew 1 tablet (81 mg) once daily., Disp: , Rfl:     cholecalciferol (Vitamin D-3) 50 mcg (2,000 unit) capsule, Take 1 capsule (50 mcg) by mouth early in the morning.., Disp: , Rfl:     DULoxetine (Cymbalta) 30 mg DR capsule, Take 1 capsule (30 mg) by mouth once daily., Disp: , Rfl:     ERGOCALCIFEROL, VITAMIN D2, ORAL, Take 2,000 Units by mouth once daily., Disp: , Rfl:     levoFLOXacin (Levaquin) 500 mg tablet, , Disp: , Rfl:     levothyroxine (Synthroid) 75 mcg tablet, Take 1 tablet (75 mcg) by mouth once daily., Disp: , Rfl:     metFORMIN (Glucophage) 500 mg tablet, Take 1 tablet (500 mg) by mouth 2 times a day with meals., Disp: , Rfl:     mirabegron (Myrbetriq) 50 mg tablet extended release 24 hr 24 hr tablet, Take 1 tablet (50 mg) by mouth once daily., Disp: , Rfl:     mirtazapine (Remeron) 7.5 mg tablet, Take 1 tablet (7.5 mg) by mouth once daily at bedtime., Disp: , Rfl:     nystatin, bulk, 15 billion unit powder, Nystatin Powder Refills: 0, Disp: , Rfl:     oxyCODONE-acetaminophen (Percocet) 7.5-325 mg tablet, Take 1 tablet by mouth every 6 hours if needed (pain)., Disp: , Rfl:     primidone (Mysoline) 250 mg tablet, Take 1 tablet (250 mg) by  mouth once daily at bedtime., Disp: , Rfl:     primidone (Mysoline) 50 mg tablet, Take 1 tablet (50 mg) by mouth once daily. Takes 1.5  tablets in the morning, Disp: , Rfl:     rOPINIRole (Requip) 3 mg tablet, Take 1 tablet (3 mg) by mouth once daily at bedtime., Disp: , Rfl:     vit A,C and E-lutein-minerals (Ocuvite) 300 mcg-200 mg-27 mg-2 mg tablet, Take 1 tablet by mouth once daily., Disp: , Rfl:     vitamins A,C,E-zinc-copper (PreserVision AREDS) 4,296 mcg-226 mg-90 mg capsule, Take 1 capsule by mouth twice a day., Disp: , Rfl:     Zocor 40 mg tablet, Take 1 tablet (40 mg) by mouth once daily., Disp: , Rfl:   Allergies   Allergen Reactions    Iodine Unknown       Neurological Exam/Physical Exam:  Talked to caregiver, daughter Kyalie on phone        Labs:  CBC:   Lab Results   Component Value Date    WBC 6.8 02/14/2022    HGB 8.0 (L) 02/14/2022    HCT 25.2 (L) 02/14/2022     02/14/2022     BMP:   Lab Results   Component Value Date     02/14/2022    K 3.9 02/14/2022     02/14/2022    CO2 32 02/14/2022    BUN 11 02/14/2022    CREATININE 0.55 02/14/2022    CALCIUM 7.4 (L) 02/14/2022    MG 1.96 02/14/2022     LFT:   Lab Results   Component Value Date    ALKPHOS 60 02/13/2022    BILITOT 0.4 02/13/2022    BILIDIR 0.1 02/13/2022    PROT 4.2 (L) 02/13/2022    ALBUMIN 2.3 (L) 02/13/2022    ALT 10 02/13/2022    AST 25 02/13/2022         Assessment/Plan   Problem List Items Addressed This Visit             ICD-10-CM    Benign essential tremor - Primary G25.0     Needs an inperson visit.   Plan to increase primidone by 1/2 tab daily until taking 200mg in morning/ 250mg at night.  Get bloodwork   Total time with patient encounter: 12 min

## 2024-05-04 NOTE — PROGRESS NOTES
5/2/2024  Name: Maria Luz Martinez  YOB: 1935    Chief complaint: Follow up for anxiety/ itching    HPI: Patient seen and examined in her room. Nursing reports patient has been less anxious and not complaining of itching with use of Hydroxyzine. Patient is also receiving treatment for restless leg syndrome and essential tremors.     Anxiety  Presents for follow-up visit. Patient reports no chest pain, confusion, depressed mood, dizziness, insomnia, irritability, nervous/anxious behavior, palpitations, restlessness or shortness of breath.         Review of systems:   ROS negative except were noted in HPI.    Code Status: DNR-CC    /62   Pulse 61   Temp 36.6 °C (97.9 °F)   Ht 1.524 m (5')   Wt 68.5 kg (151 lb)   BMI 29.49 kg/m²      Physical Exam  Constitutional:       General: She is not in acute distress.  HENT:      Head: Normocephalic.      Nose: Nose normal. No congestion.      Mouth/Throat:      Mouth: Mucous membranes are moist.   Eyes:      Comments: L eye enucleation    Cardiovascular:      Rate and Rhythm: Normal rate and regular rhythm.      Pulses: Normal pulses.   Pulmonary:      Effort: Pulmonary effort is normal.      Breath sounds: Normal breath sounds.   Abdominal:      General: Bowel sounds are normal.      Palpations: Abdomen is soft.      Tenderness: There is no abdominal tenderness. There is no guarding.   Genitourinary:     Comments: Voiding   Musculoskeletal:         General: Normal range of motion.      Cervical back: Normal range of motion.   Lymphadenopathy:      Cervical: No cervical adenopathy.   Skin:     General: Skin is warm and dry.      Capillary Refill: Capillary refill takes less than 2 seconds.   Neurological:      General: No focal deficit present.      Mental Status: She is alert and oriented to person, place, and time.   Psychiatric:         Mood and Affect: Mood normal.         Behavior: Behavior normal.     Medications reviewed during visit at  facility.  Synthroid 75 mcg po daily  Preser Vision AREDS one capsule po daily  Tylenol 650 mg po q 4 hours prn  Mirabegron ER 50 mg po daily  Metformin 500 mg po bid  Miralax 17 grams po daily  Biotin 1000 mcg po daily  MOM 30 ml po daily prn  Simvastatin 40 mg po daily  Gabapentin 100 mg po daily  Cymbalta 30 mg po daily  Oxycodone 7.5/325 mg po q 6 hours prn  Aspirin 81 mg po daily.  Vitamin D 3 2000 international units po daily  Primidone 50 mg 2.5 tablets po hs  Primidone 50 mg 1.5 tablets po daily  Ropinirole 3 mg po daily  Trazodone 25 mg po q hs  Mirtazapine 7.5 mg po daily  Hydroxyzine 50 mg po at 0800  Hydroxyzine 25 mg po daily prn.  Ipratropium-Albuterol Solution 0.5-2.5 (3) MG/3ML unit dose q 6 hours  Hydrocortisone External Cream 1 % (Hydrocortisone apply topically q 8 hours prn.  Labs reviewed at facility:    Laboratory: 4/3/2024 15:13 BASIC MET PNL INCL GFR (BMP) / GLYCO-HGBA1C / CBC W/DIFF / T3, TOTAL / TSH 3-UL / T4, FREE / HEMOGLOBIN A1C  Latest Version (more available)  Reviewed by steffanie on 4/3/2024 16:02  Resident Information  Resident: Maria Luz Rodgers ([id number] NF)  Admit Date: 2021  Admitting Provider:   Attending Provider:   Copy to List:   Report Information  Collection Date: 2024 09:15  Received Date: 2024 12:32  Reported Date: 4/3/2024 15:13  Ord. Provider: Thu Torres  Source [place]: [id number]  Lab Information  Status: Resulted  Flag:    Reporting Lab:   [facility]  Order #:   [id number]  Vendor Order #:   [id number]  Category:   Chemistry, Hematology, Unknown Category  Order Notes  Result for:  MARIA LUZ RODGERS  ( 1935, F)         Results   Show All Details Result Units Ref. Range Flag Status   BASIC MET PNL INCL GFR (BMP)       GLUCOSE  108 mg/dL  H Final      GLUCOSE, FASTING         65-99  mg/dL                               GLUCOSE, NON-FASTING      mg/dL       SODIUM  138 mEq/L 136-145  Final           POTASSIUM  4.1 mEq/L 3.5-5.3  Final            CHLORIDE  99 mEq/L   Final           CARBON DIOXIDE (CO2)  25 mEq/L 21-33  Final           BUN (UREA NITROGEN)  11 mg/dL 7-25  Final           CREATININE  0.5 mg/dL 0.6-1.2 L Final           BUN/CREATININE RATIO  22  6-22  Final           GFR-  141 mL/min/1.73 m2 >60  Final           GFR-NON-AFRICAN AMERICAN  116 mL/min/1.73 m2 >60  Final           Stage of CKD     eGFR (mL/min/1.73 square meters)          Stage 1        >/= 90        or > 90          Stage 2        60 - 89          Stage 3        30 - 59          Stage 4        15 - 29          Stage 5        </= 14        or < 15  ** GFR is reliable for adults 17 to 69 years with stable kidney      function.        CALCIUM  9.1 mg/dL 8.4-10.2  Final       GLYCO-HGBA1C       GLYCOHEMOGLOBIN-HGBA1C  PENDING    Pending           eAG (Mean Glucose)  PENDING    Pending       CBC W/DIFF       WBC  5.4 K/cmm 4.5-10.8  Final           RBC  4.38 M/cmm 3.90-5.40  Final           HEMOGLOBIN  13.8 g/dL 12.0-16.0  Final           HEMATOCRIT  41.6 % 36.0-48.0  Final           MCV  95.0 fL 80.0-100.0  Final           MCH  31.5 pg 26.0-35.0  Final           MCHC  33.2 g/dL 31.0-36.5  Final           RDW  15.1 % 11.0-16.0  Final           PLATELET  193 K/cmm 150-450  Final           MPV  7.6 fL 6.5-12.0  Final           NEUTROPHILS  49.5 % 40.0-80.0  Final           LYMPHS  35.7 % 13.0-48.0  Final           MONOCYTES  11.1 % 2.0-12.0  Final           EOS  3.0 % 0.0-8.0  Final           BASO  0.7 % 0.0-2.0  Final           NEUTS (ABSOLUTE)  2.70 K/uL 1.50-7.60  Final           LYMPHS (ABSOLUTE)  1.90 K/uL 0.90-5.50  Final           MONOCYTES (ABSOLUTE)  0.60 K/uL 0.15-1.10  Final           EOS (ABSOLUTE)  0.20 K/uL 0.20-0.80  Final           NUCLEATED RBC  0.2 NRBC/100 WBC <1.0  Final       T3, TOTAL  0.81 ng/mL 0.8-1.8  Final       TSH 3-UL  1.765 uIU/mL 0.340-5.500  Final       T4, FREE  0.97 ng/dL 0.6-1.7  Final       HEMOGLOBIN A1C  5.3 % 5.7-6.5 L Final       5.7 -6.5  PREDIABETIC  Assessment/Plan    Problem List Items Addressed This Visit       Restless leg syndrome     Ropinirole 3 mg po daily         Benign essential tremor     Appointment with Dr. Tafoya of neurology tomorrow. Primidone 50 mg 2.5 tablets po hs. Primidone 50 mg 1.5 tablets po daily         Major depression, recurrent, chronic (CMS-HCC)     Cymbalta 30 mg po daily         Generalized anxiety disorder - Primary     Hydroxyzine 50 mg po daily. Hydroxyzine 25 mg po daily prn.            Time:    Jad Milan PA-C

## 2024-05-05 PROBLEM — F41.1 GENERALIZED ANXIETY DISORDER: Status: ACTIVE | Noted: 2024-05-05

## 2024-05-05 ASSESSMENT — ENCOUNTER SYMPTOMS
NERVOUS/ANXIOUS: 0
SHORTNESS OF BREATH: 0
DIZZINESS: 0
PALPITATIONS: 0
CONFUSION: 0
DEPRESSED MOOD: 0
INSOMNIA: 0
RESTLESSNESS: 0
IRRITABILITY: 0

## 2024-05-05 NOTE — ASSESSMENT & PLAN NOTE
Appointment with Dr. Tafoya of neurology tomorrow. Primidone 50 mg 2.5 tablets po hs. Primidone 50 mg 1.5 tablets po daily

## 2024-05-07 ENCOUNTER — NURSING HOME VISIT (OUTPATIENT)
Dept: POST ACUTE CARE | Facility: EXTERNAL LOCATION | Age: 89
End: 2024-05-07
Payer: MEDICARE

## 2024-05-07 DIAGNOSIS — E11.40 TYPE 2 DIABETES MELLITUS WITH DIABETIC NEUROPATHY, UNSPECIFIED WHETHER LONG TERM INSULIN USE (MULTI): Chronic | ICD-10-CM

## 2024-05-07 DIAGNOSIS — F33.9 MAJOR DEPRESSION, RECURRENT, CHRONIC (CMS-HCC): ICD-10-CM

## 2024-05-07 DIAGNOSIS — G25.0 BENIGN ESSENTIAL TREMOR: ICD-10-CM

## 2024-05-07 DIAGNOSIS — R21 RASH: Primary | ICD-10-CM

## 2024-05-07 DIAGNOSIS — E03.9 HYPOTHYROIDISM, UNSPECIFIED TYPE: Chronic | ICD-10-CM

## 2024-05-07 PROCEDURE — 99309 SBSQ NF CARE MODERATE MDM 30: CPT | Performed by: PHYSICIAN ASSISTANT

## 2024-05-07 NOTE — LETTER
Patient: Maria Luz Martinez  : 1935    Encounter Date: 2024  Name: Maria Luz Martinez  YOB: 1935    Chief complaint: Scattered red rash to chest.    HPI: Patient examined in her room. She is resting quietly in bed. Nursing reported noticing reddened rash to chest which began two days ago. Reddened scattered bumps to chest suggestive of contact dermatitis. Patient has order in place for Dreft linens and has been receiving Hydroxyzine. Rash is not observed any where else on her body. Patient not able to provide medical history or participate in ROS    Code Status: DNR-CC    /62   Pulse 61   Temp 36.6 °C (97.9 °F)   Resp 18   Ht 1.524 m (5')   Wt 70.8 kg (156 lb)   SpO2 96%   BMI 30.47 kg/m²      Physical Exam  Constitutional:       General: She is not in acute distress.  HENT:      Head: Normocephalic.      Nose: Nose normal. No congestion.      Mouth/Throat:      Mouth: Mucous membranes are moist.   Eyes:      Comments: L eye enucleation    Cardiovascular:      Rate and Rhythm: Normal rate and regular rhythm.      Pulses: Normal pulses.   Pulmonary:      Effort: Pulmonary effort is normal.      Breath sounds: Normal breath sounds.   Abdominal:      General: Bowel sounds are normal.      Palpations: Abdomen is soft.      Tenderness: There is no abdominal tenderness. There is no guarding.   Genitourinary:     Comments: Voiding   Musculoskeletal:         General: Normal range of motion.      Cervical back: Normal range of motion.   Lymphadenopathy:      Cervical: No cervical adenopathy.   Skin:     General: Skin is warm and dry.      Capillary Refill: Capillary refill takes less than 2 seconds.      Reddened bumps to chest.   Neurological:      General: No focal deficit present.      Mental Status: She is alert and oriented to person, place, and time.   Psychiatric:         Mood and Affect: Mood normal.         Behavior: Behavior normal.     Medications reviewed during visit at  facility.  Synthroid 75 mcg po daily  Preser Vision AREDS one capsule po daily  Tylenol 650 mg po q 4 hours prn  Mirabegron ER 50 mg po daily  Metformin 500 mg po bid  Miralax 17 grams po daily  Biotin 1000 mcg po daily  MOM 30 ml po daily prn  Simvastatin 40 mg po daily  Gabapentin 100 mg po daily  Cymbalta 30 mg po daily  Oxycodone 7.5/325 mg po q 6 hours prn  Aspirin 81 mg po daily.  Vitamin D 3 2000 international units po daily  Primidone 50 mg 5 tablets po hs  Primidone 50 mg 2.5 tablets po daily  Ropinirole 3 mg po daily  Trazodone 25 mg po q hs  Mirtazapine 7.5 mg po daily  Hydroxyzine 50 mg po at 0800  Hydroxyzine 25 mg po evening prn.  Ipratropium-Albuterol Solution 0.5-2.5 (3) MG/3ML unit dose q 6 hours  Hydrocortisone External Cream 1 % (Hydrocortisone apply topically q 8 hours prn.  Labs reviewed at facility:    Laboratory: 2024 16:09 HFP-HEPATIC FUNCTION PANEL / CBC W/DIFF  Latest Version (more available)  Resident Information  Resident: Alicecuong Maria Luz ([id number] NF)  Admit Date: 2021  Admitting Provider:   Attending Provider:   Copy to List:   Report Information  Collection Date: 2024 08:00  Received Date: 2024 08:38  Reported Date: 2024 16:09  Ord. Provider: Jerry Torres  Source [place]: [id number]  Lab Information  Status: Completed  Flag:    Reporting Lab:   [facility]  Order #:   [id number]  Vendor Order #:   [id number]  Category:   Chemistry, Hematology  Order Notes  Result for:  ALICECUONG MARIA LUZ  ( 1935, F)         Results   Show All Details Result Units Ref. Range Flag Status   HFP-HEPATIC FUNCTION PANEL       PROTEIN, TOTAL  5.7 g/dL 6.0-8.3 L Final           ALBUMIN  3.4 g/dL 3.5-5.5 L Final           A/G RATIO  1.5  1.0-2.3  Final           ALKALINE PHOS  71 IU/L   Final           AST (SGOT)  19 IU/L 4-40  Final           ALT (SGPT)  17 IU/L 4-55  Final           BILIRUBIN, TOTAL  0.2 mg/dL 0.2-1.2  Final           BILIRUBIN, DIRECT  0.1 mg/dL <0.4  Final        CBC W/DIFF       WBC  5.7 K/cmm 4.5-10.8  Final           RBC  4.25 M/cmm 3.90-5.40  Final           HEMOGLOBIN  13.3 g/dL 12.0-16.0  Final           HEMATOCRIT  39.6 % 36.0-48.0  Final           MCV  93.0 fL 80.0-100.0  Final           MCH  31.4 pg 26.0-35.0  Final           MCHC  33.7 g/dL 31.0-36.5  Final           RDW  14.3 % 11.0-16.0  Final           PLATELET  178 K/cmm 150-450  Final           MPV  7.3 fL 6.5-12.0  Final           NEUTROPHILS  44.0 % 40.0-80.0  Final           LYMPHS  37.7 % 13.0-48.0  Final           MONOCYTES  12.0 % 2.0-12.0  Final           EOS  5.7 % 0.0-8.0  Final           BASO  0.6 % 0.0-2.0  Final           NEUTS (ABSOLUTE)  2.50 K/uL 1.50-7.60  Final           LYMPHS (ABSOLUTE)  2.10 K/uL 0.90-5.50  Final           MONOCYTES (ABSOLUTE)  0.70 K/uL 0.15-1.10  Final           EOS (ABSOLUTE)  0.30 K/uL 0.20-0.80  Final           NUCLEATED RBC  0.1 NRBC/100 WBC <1.0  Final      Assessment/Plan   Problem List Items Addressed This Visit       DM2 (diabetes mellitus, type 2) (Multi) (Chronic)     Review blood sugar readings. Blood sugar today 166. Continue with Metformin 500 mg po bid.          Hypothyroidism (Chronic)     Synthroid 75 mcg po daily,         Benign essential tremor     Primidone 50 mg 5 tablets po hs. Primidone 50 mg 2.5 tablets po daily.         Major depression, recurrent, chronic (CMS-HCC)     Cymbalta 30 mg po daily.         Rash - Primary     Contact dermatitis. Possible drug reaction to increased dose of Primidone. Patient has been on Primidone for several months but has had dose recently increased per neurologist. Monitor. Hydroxyzine 50 mg po daily and 25 mg at hs prn.            Time:    Jad Milan PA-C       Electronically Signed By: Jad Milan PA-C   5/9/24 10:32 PM

## 2024-05-08 VITALS
BODY MASS INDEX: 30.63 KG/M2 | HEIGHT: 60 IN | TEMPERATURE: 97.9 F | WEIGHT: 156 LBS | SYSTOLIC BLOOD PRESSURE: 129 MMHG | OXYGEN SATURATION: 96 % | HEART RATE: 61 BPM | RESPIRATION RATE: 18 BRPM | DIASTOLIC BLOOD PRESSURE: 62 MMHG

## 2024-05-09 PROBLEM — R21 RASH: Status: ACTIVE | Noted: 2024-05-09

## 2024-05-09 NOTE — PROGRESS NOTES
5/7/2024  Name: Maria Luz Martinez  YOB: 1935    Chief complaint: Scattered red rash to chest.    HPI: Patient examined in her room. She is resting quietly in bed. Nursing reported noticing reddened rash to chest which began two days ago. Reddened scattered bumps to chest suggestive of contact dermatitis. Patient has order in place for Dreft linens and has been receiving Hydroxyzine. Rash is not observed any where else on her body. Patient not able to provide medical history or participate in ROS    Code Status: DNR-CC    /62   Pulse 61   Temp 36.6 °C (97.9 °F)   Resp 18   Ht 1.524 m (5')   Wt 70.8 kg (156 lb)   SpO2 96%   BMI 30.47 kg/m²      Physical Exam  Constitutional:       General: She is not in acute distress.  HENT:      Head: Normocephalic.      Nose: Nose normal. No congestion.      Mouth/Throat:      Mouth: Mucous membranes are moist.   Eyes:      Comments: L eye enucleation    Cardiovascular:      Rate and Rhythm: Normal rate and regular rhythm.      Pulses: Normal pulses.   Pulmonary:      Effort: Pulmonary effort is normal.      Breath sounds: Normal breath sounds.   Abdominal:      General: Bowel sounds are normal.      Palpations: Abdomen is soft.      Tenderness: There is no abdominal tenderness. There is no guarding.   Genitourinary:     Comments: Voiding   Musculoskeletal:         General: Normal range of motion.      Cervical back: Normal range of motion.   Lymphadenopathy:      Cervical: No cervical adenopathy.   Skin:     General: Skin is warm and dry.      Capillary Refill: Capillary refill takes less than 2 seconds.      Reddened bumps to chest.   Neurological:      General: No focal deficit present.      Mental Status: She is alert and oriented to person, place, and time.   Psychiatric:         Mood and Affect: Mood normal.         Behavior: Behavior normal.     Medications reviewed during visit at facility.  Synthroid 75 mcg po daily  Preser Vision AREDS one capsule  po daily  Tylenol 650 mg po q 4 hours prn  Mirabegron ER 50 mg po daily  Metformin 500 mg po bid  Miralax 17 grams po daily  Biotin 1000 mcg po daily  MOM 30 ml po daily prn  Simvastatin 40 mg po daily  Gabapentin 100 mg po daily  Cymbalta 30 mg po daily  Oxycodone 7.5/325 mg po q 6 hours prn  Aspirin 81 mg po daily.  Vitamin D 3 2000 international units po daily  Primidone 50 mg 5 tablets po hs  Primidone 50 mg 2.5 tablets po daily  Ropinirole 3 mg po daily  Trazodone 25 mg po q hs  Mirtazapine 7.5 mg po daily  Hydroxyzine 50 mg po at 0800  Hydroxyzine 25 mg po evening prn.  Ipratropium-Albuterol Solution 0.5-2.5 (3) MG/3ML unit dose q 6 hours  Hydrocortisone External Cream 1 % (Hydrocortisone apply topically q 8 hours prn.  Labs reviewed at facility:    Laboratory: 2024 16:09 HFP-HEPATIC FUNCTION PANEL / CBC W/DIFF  Latest Version (more available)  Resident Information  Resident: Maria Luz Rodgers ([id number] NF)  Admit Date: 2021  Admitting Provider:   Attending Provider:   Copy to List:   Report Information  Collection Date: 2024 08:00  Received Date: 2024 08:38  Reported Date: 2024 16:09  Ord. Provider: Jerry Torres  Source [place]: [id number]  Lab Information  Status: Completed  Flag:    Reporting Lab:   [facility]  Order #:   [id number]  Vendor Order #:   [id number]  Category:   Chemistry, Hematology  Order Notes  Result for:  MARIA LUZ RODGERS  ( 1935, F)         Results   Show All Details Result Units Ref. Range Flag Status   HFP-HEPATIC FUNCTION PANEL       PROTEIN, TOTAL  5.7 g/dL 6.0-8.3 L Final           ALBUMIN  3.4 g/dL 3.5-5.5 L Final           A/G RATIO  1.5  1.0-2.3  Final           ALKALINE PHOS  71 IU/L   Final           AST (SGOT)  19 IU/L 4-40  Final           ALT (SGPT)  17 IU/L 4-55  Final           BILIRUBIN, TOTAL  0.2 mg/dL 0.2-1.2  Final           BILIRUBIN, DIRECT  0.1 mg/dL <0.4  Final       CBC W/DIFF       WBC  5.7 K/cmm 4.5-10.8  Final            RBC  4.25 M/cmm 3.90-5.40  Final           HEMOGLOBIN  13.3 g/dL 12.0-16.0  Final           HEMATOCRIT  39.6 % 36.0-48.0  Final           MCV  93.0 fL 80.0-100.0  Final           MCH  31.4 pg 26.0-35.0  Final           MCHC  33.7 g/dL 31.0-36.5  Final           RDW  14.3 % 11.0-16.0  Final           PLATELET  178 K/cmm 150-450  Final           MPV  7.3 fL 6.5-12.0  Final           NEUTROPHILS  44.0 % 40.0-80.0  Final           LYMPHS  37.7 % 13.0-48.0  Final           MONOCYTES  12.0 % 2.0-12.0  Final           EOS  5.7 % 0.0-8.0  Final           BASO  0.6 % 0.0-2.0  Final           NEUTS (ABSOLUTE)  2.50 K/uL 1.50-7.60  Final           LYMPHS (ABSOLUTE)  2.10 K/uL 0.90-5.50  Final           MONOCYTES (ABSOLUTE)  0.70 K/uL 0.15-1.10  Final           EOS (ABSOLUTE)  0.30 K/uL 0.20-0.80  Final           NUCLEATED RBC  0.1 NRBC/100 WBC <1.0  Final      Assessment/Plan    Problem List Items Addressed This Visit       DM2 (diabetes mellitus, type 2) (Multi) (Chronic)     Review blood sugar readings. Blood sugar today 166. Continue with Metformin 500 mg po bid.          Hypothyroidism (Chronic)     Synthroid 75 mcg po daily,         Benign essential tremor     Primidone 50 mg 5 tablets po hs. Primidone 50 mg 2.5 tablets po daily.         Major depression, recurrent, chronic (CMS-HCC)     Cymbalta 30 mg po daily.         Rash - Primary     Contact dermatitis. Possible drug reaction to increased dose of Primidone. Patient has been on Primidone for several months but has had dose recently increased per neurologist. Monitor. Hydroxyzine 50 mg po daily and 25 mg at hs prn.            Time:    Jad Milan PA-C

## 2024-05-10 NOTE — ASSESSMENT & PLAN NOTE
Contact dermatitis. Possible drug reaction to increased dose of Primidone. Patient has been on Primidone for several months but has had dose recently increased per neurologist. Monitor. Hydroxyzine 50 mg po daily and 25 mg at hs prn.

## 2024-05-16 ENCOUNTER — NURSING HOME VISIT (OUTPATIENT)
Dept: POST ACUTE CARE | Facility: EXTERNAL LOCATION | Age: 89
End: 2024-05-16
Payer: MEDICARE

## 2024-05-16 DIAGNOSIS — R21 RASH: Primary | ICD-10-CM

## 2024-05-16 DIAGNOSIS — E03.9 HYPOTHYROIDISM, UNSPECIFIED TYPE: Chronic | ICD-10-CM

## 2024-05-16 DIAGNOSIS — E11.40 TYPE 2 DIABETES MELLITUS WITH DIABETIC NEUROPATHY, UNSPECIFIED WHETHER LONG TERM INSULIN USE (MULTI): Chronic | ICD-10-CM

## 2024-05-16 PROCEDURE — 99308 SBSQ NF CARE LOW MDM 20: CPT | Performed by: PHYSICIAN ASSISTANT

## 2024-05-16 NOTE — LETTER
Patient: Maria Luz Martinez  : 1935    Encounter Date: 2024  Name: Maria Luz Martinez  YOB: 1935    Chief complaint: Follow up for rash to chest    HPI: Patient had appointment with Dermatology service yesterday to evaluate raised , bumpy rash to chest. She returned with order to begin Triamcinolone cream to affected area. Details of exam not available. On exam this afternoon, rash to chest showing improvement with current treatment. Patient's  present. He was up dated on current treatment plan.    Rash  This is a new problem. The current episode started 1 to 4 weeks ago. The problem has been gradually improving since onset. The affected locations include the chest. The rash is characterized by itchiness and redness. She was exposed to nothing. Pertinent negatives include no anorexia, congestion, cough, diarrhea, facial edema, fever, joint pain or shortness of breath. Treatments tried: Triamcinolone. The treatment provided moderate relief.     Review of systems:   ROS negative except were noted in HPI.    Code Status: DNR-CC    /62   Pulse 61   Temp 36.6 °C (97.9 °F)   Resp 18   Ht 1.524 m (5')   Wt 70.8 kg (156 lb)   SpO2 96%   BMI 30.47 kg/m²      Physical Exam  Constitutional:       General: She is not in acute distress.  HENT:      Head: Normocephalic.      Nose: Nose normal. No congestion.      Mouth/Throat:      Mouth: Mucous membranes are moist.   Eyes:      Comments: L eye enucleation    Cardiovascular:      Rate and Rhythm: Normal rate and regular rhythm.      Pulses: Normal pulses.   Pulmonary:      Effort: Pulmonary effort is normal.      Breath sounds: Normal breath sounds.   Abdominal:      General: Bowel sounds are normal.      Palpations: Abdomen is soft.      Tenderness: There is no abdominal tenderness. There is no guarding.   Genitourinary:     Comments: Voiding   Musculoskeletal:         General: Normal range of motion.      Cervical back: Normal  range of motion.   Lymphadenopathy:      Cervical: No cervical adenopathy.   Skin:     General: Skin is warm and dry.      Capillary Refill: Capillary refill takes less than 2 seconds.      Reddened bumps to chest.   Neurological:      General: No focal deficit present.      Mental Status: She is alert and oriented to person, place, and time.   Psychiatric:         Mood and Affect: Mood normal.         Behavior: Behavior normal.     Medications reviewed during visit at facility.  Synthroid 75 mcg po daily  Preser Vision AREDS one capsule po daily  Tylenol 650 mg po q 4 hours prn  Mirabegron ER 50 mg po daily  Metformin 500 mg po bid  Miralax 17 grams po daily  Biotin 1000 mcg po daily  MOM 30 ml po daily prn  Simvastatin 40 mg po daily  Gabapentin 100 mg po daily  Cymbalta 30 mg po daily  Oxycodone 7.5/325 mg po q 6 hours prn  Aspirin 81 mg po daily.  Vitamin D 3 2000 international units po daily  Primidone 50 mg 5 tablets po hs  Primidone 50 mg 2.5 tablets po daily  Ropinirole 3 mg po daily  Trazodone 25 mg po q hs  Mirtazapine 7.5 mg po daily  Hydroxyzine 50 mg po at 0800  Hydroxyzine 25 mg po evening prn.  Ipratropium-Albuterol Solution 0.5-2.5 (3) MG/3ML unit dose q 6 hours  Triamcinolone Acetonide Cream 0.1 % apply to chest bid  Hydrocortisone External Cream 1 % (Hydrocortisone apply topically q 8 hours prn.  Labs reviewed at facility:    Laboratory: 5/9/2024 16:09 HFP-HEPATIC FUNCTION PANEL / CBC W/DIFF  Latest Version (more available)  Reviewed by steffanie on 5/10/2024 00:11  Resident Information  Resident: Maria Luz Martinez ([id number] NF)  Admit Date: 5/2/2021  Admitting Provider:   Attending Provider:   Copy to List:   Report Information  Collection Date: 5/9/2024 08:00  Received Date: 5/9/2024 08:38  Reported Date: 5/9/2024 16:09  Ord. Provider: Jerry Torres  Source [place]: [id number]  Lab Information  Status: Completed  Flag:    Reporting Lab:   [facility]  Order #:   [id number]  Vendor Order  #:   [id number]  Category:   Chemistry, Hematology  Order Notes  Result for:  TOM RODGERS  ( 1935, F)         Results   Show All Details Result Units Ref. Range Flag Status   HFP-HEPATIC FUNCTION PANEL       PROTEIN, TOTAL  5.7 g/dL 6.0-8.3 L Final           ALBUMIN  3.4 g/dL 3.5-5.5 L Final           A/G RATIO  1.5  1.0-2.3  Final           ALKALINE PHOS  71 IU/L   Final           AST (SGOT)  19 IU/L 4-40  Final           ALT (SGPT)  17 IU/L 4-55  Final           BILIRUBIN, TOTAL  0.2 mg/dL 0.2-1.2  Final           BILIRUBIN, DIRECT  0.1 mg/dL <0.4  Final       CBC W/DIFF       WBC  5.7 K/cmm 4.5-10.8  Final           RBC  4.25 M/cmm 3.90-5.40  Final           HEMOGLOBIN  13.3 g/dL 12.0-16.0  Final           HEMATOCRIT  39.6 % 36.0-48.0  Final           MCV  93.0 fL 80.0-100.0  Final           MCH  31.4 pg 26.0-35.0  Final           MCHC  33.7 g/dL 31.0-36.5  Final           RDW  14.3 % 11.0-16.0  Final           PLATELET  178 K/cmm 150-450  Final           MPV  7.3 fL 6.5-12.0  Final           NEUTROPHILS  44.0 % 40.0-80.0  Final           LYMPHS  37.7 % 13.0-48.0  Final           MONOCYTES  12.0 % 2.0-12.0  Final           EOS  5.7 % 0.0-8.0  Final           BASO  0.6 % 0.0-2.0  Final           NEUTS (ABSOLUTE)  2.50 K/uL 1.50-7.60  Final           LYMPHS (ABSOLUTE)  2.10 K/uL 0.90-5.50  Final           MONOCYTES (ABSOLUTE)  0.70 K/uL 0.15-1.10  Final           EOS (ABSOLUTE)  0.30 K/uL 0.20-0.80  Final           NUCLEATED RBC  0.1 NRBC/100 WBC <1.0  Assessment/Plan   Problem List Items Addressed This Visit       DM2 (diabetes mellitus, type 2) (Multi) (Chronic)     Review blood sugar readings. Blood sugar today 145. Continue with Metformin 500 mg po bid.             Hypothyroidism (Chronic)     Synthroid 75 mcg po daily, check TSH and Free T-4            Rash - Primary     Triamcinolone cream to chest bid. Schedule follow up with Dermatology in 2 weeks.            Time:    Jad Milan,  LEFTY       Electronically Signed By: Jad Milan PA-C   5/18/24  9:27 PM

## 2024-05-18 VITALS
OXYGEN SATURATION: 96 % | SYSTOLIC BLOOD PRESSURE: 129 MMHG | HEART RATE: 61 BPM | TEMPERATURE: 97.9 F | RESPIRATION RATE: 18 BRPM | BODY MASS INDEX: 30.63 KG/M2 | WEIGHT: 156 LBS | HEIGHT: 60 IN | DIASTOLIC BLOOD PRESSURE: 62 MMHG

## 2024-05-18 ASSESSMENT — ENCOUNTER SYMPTOMS
ANOREXIA: 0
DIARRHEA: 0
FEVER: 0
SHORTNESS OF BREATH: 0
COUGH: 0

## 2024-05-19 NOTE — PROGRESS NOTES
5/16/204  Name: Maria Luz Martinez  YOB: 1935    Chief complaint: Follow up for rash to chest    HPI: Patient had appointment with Dermatology service yesterday to evaluate raised , bumpy rash to chest. She returned with order to begin Triamcinolone cream to affected area. Details of exam not available. On exam this afternoon, rash to chest showing improvement with current treatment. Patient's  present. He was up dated on current treatment plan.    Rash  This is a new problem. The current episode started 1 to 4 weeks ago. The problem has been gradually improving since onset. The affected locations include the chest. The rash is characterized by itchiness and redness. She was exposed to nothing. Pertinent negatives include no anorexia, congestion, cough, diarrhea, facial edema, fever, joint pain or shortness of breath. Treatments tried: Triamcinolone. The treatment provided moderate relief.     Review of systems:   ROS negative except were noted in HPI.    Code Status: DNR-CC    /62   Pulse 61   Temp 36.6 °C (97.9 °F)   Resp 18   Ht 1.524 m (5')   Wt 70.8 kg (156 lb)   SpO2 96%   BMI 30.47 kg/m²      Physical Exam  Constitutional:       General: She is not in acute distress.  HENT:      Head: Normocephalic.      Nose: Nose normal. No congestion.      Mouth/Throat:      Mouth: Mucous membranes are moist.   Eyes:      Comments: L eye enucleation    Cardiovascular:      Rate and Rhythm: Normal rate and regular rhythm.      Pulses: Normal pulses.   Pulmonary:      Effort: Pulmonary effort is normal.      Breath sounds: Normal breath sounds.   Abdominal:      General: Bowel sounds are normal.      Palpations: Abdomen is soft.      Tenderness: There is no abdominal tenderness. There is no guarding.   Genitourinary:     Comments: Voiding   Musculoskeletal:         General: Normal range of motion.      Cervical back: Normal range of motion.   Lymphadenopathy:      Cervical: No cervical  adenopathy.   Skin:     General: Skin is warm and dry.      Capillary Refill: Capillary refill takes less than 2 seconds.      Reddened bumps to chest.   Neurological:      General: No focal deficit present.      Mental Status: She is alert and oriented to person, place, and time.   Psychiatric:         Mood and Affect: Mood normal.         Behavior: Behavior normal.     Medications reviewed during visit at facility.  Synthroid 75 mcg po daily  Preser Vision AREDS one capsule po daily  Tylenol 650 mg po q 4 hours prn  Mirabegron ER 50 mg po daily  Metformin 500 mg po bid  Miralax 17 grams po daily  Biotin 1000 mcg po daily  MOM 30 ml po daily prn  Simvastatin 40 mg po daily  Gabapentin 100 mg po daily  Cymbalta 30 mg po daily  Oxycodone 7.5/325 mg po q 6 hours prn  Aspirin 81 mg po daily.  Vitamin D 3 2000 international units po daily  Primidone 50 mg 5 tablets po hs  Primidone 50 mg 2.5 tablets po daily  Ropinirole 3 mg po daily  Trazodone 25 mg po q hs  Mirtazapine 7.5 mg po daily  Hydroxyzine 50 mg po at 0800  Hydroxyzine 25 mg po evening prn.  Ipratropium-Albuterol Solution 0.5-2.5 (3) MG/3ML unit dose q 6 hours  Triamcinolone Acetonide Cream 0.1 % apply to chest bid  Hydrocortisone External Cream 1 % (Hydrocortisone apply topically q 8 hours prn.  Labs reviewed at facility:    Laboratory: 5/9/2024 16:09 HFP-HEPATIC FUNCTION PANEL / CBC W/DIFF  Latest Version (more available)  Reviewed by steffanie on 5/10/2024 00:11  Resident Information  Resident: Maria Luz Martinez ([id number] NF)  Admit Date: 5/2/2021  Admitting Provider:   Attending Provider:   Copy to List:   Report Information  Collection Date: 5/9/2024 08:00  Received Date: 5/9/2024 08:38  Reported Date: 5/9/2024 16:09  Ord. Provider: Jerry Torres  Source [place]: [id number]  Lab Information  Status: Completed  Flag:    Reporting Lab:   [facility]  Order #:   [id number]  Vendor Order #:   [id number]  Category:   Chemistry, Hematology  Order Notes  Result for:   TOM RODGERS  ( 1935, F)         Results   Show All Details Result Units Ref. Range Flag Status   HFP-HEPATIC FUNCTION PANEL       PROTEIN, TOTAL  5.7 g/dL 6.0-8.3 L Final           ALBUMIN  3.4 g/dL 3.5-5.5 L Final           A/G RATIO  1.5  1.0-2.3  Final           ALKALINE PHOS  71 IU/L   Final           AST (SGOT)  19 IU/L 4-40  Final           ALT (SGPT)  17 IU/L 4-55  Final           BILIRUBIN, TOTAL  0.2 mg/dL 0.2-1.2  Final           BILIRUBIN, DIRECT  0.1 mg/dL <0.4  Final       CBC W/DIFF       WBC  5.7 K/cmm 4.5-10.8  Final           RBC  4.25 M/cmm 3.90-5.40  Final           HEMOGLOBIN  13.3 g/dL 12.0-16.0  Final           HEMATOCRIT  39.6 % 36.0-48.0  Final           MCV  93.0 fL 80.0-100.0  Final           MCH  31.4 pg 26.0-35.0  Final           MCHC  33.7 g/dL 31.0-36.5  Final           RDW  14.3 % 11.0-16.0  Final           PLATELET  178 K/cmm 150-450  Final           MPV  7.3 fL 6.5-12.0  Final           NEUTROPHILS  44.0 % 40.0-80.0  Final           LYMPHS  37.7 % 13.0-48.0  Final           MONOCYTES  12.0 % 2.0-12.0  Final           EOS  5.7 % 0.0-8.0  Final           BASO  0.6 % 0.0-2.0  Final           NEUTS (ABSOLUTE)  2.50 K/uL 1.50-7.60  Final           LYMPHS (ABSOLUTE)  2.10 K/uL 0.90-5.50  Final           MONOCYTES (ABSOLUTE)  0.70 K/uL 0.15-1.10  Final           EOS (ABSOLUTE)  0.30 K/uL 0.20-0.80  Final           NUCLEATED RBC  0.1 NRBC/100 WBC <1.0  Assessment/Plan    Problem List Items Addressed This Visit       DM2 (diabetes mellitus, type 2) (Multi) (Chronic)     Review blood sugar readings. Blood sugar today 145. Continue with Metformin 500 mg po bid.             Hypothyroidism (Chronic)     Synthroid 75 mcg po daily, check TSH and Free T-4            Rash - Primary     Triamcinolone cream to chest bid. Schedule follow up with Dermatology in 2 weeks.            Time:    Jad Milan PA-C

## 2024-06-20 ENCOUNTER — NURSING HOME VISIT (OUTPATIENT)
Dept: POST ACUTE CARE | Facility: EXTERNAL LOCATION | Age: 89
End: 2024-06-20
Payer: MEDICARE

## 2024-06-20 DIAGNOSIS — E55.9 VITAMIN D DEFICIENCY: ICD-10-CM

## 2024-06-20 DIAGNOSIS — N31.9 NEUROGENIC BLADDER: ICD-10-CM

## 2024-06-20 DIAGNOSIS — E11.40 TYPE 2 DIABETES MELLITUS WITH DIABETIC NEUROPATHY, UNSPECIFIED WHETHER LONG TERM INSULIN USE (MULTI): Primary | Chronic | ICD-10-CM

## 2024-06-20 NOTE — LETTER
Patient: Maria Luz Martinez  : 1935    Encounter Date: 2024  Name: Maria Luz Martinez  YOB: 1935    Chief complaint: Follow up for diabetes.    HPI: Patient alert and cooperative with exam. Review of chart shows average blood sugar readings between 110-150 mg/dl. She is currently prescribed Metformin which has been well tolerated. Patient's weight has been stable. She has a CCD consistent carbohydrate diet.    Diabetes  She presents for her follow-up diabetic visit. She has type 2 diabetes mellitus. Her disease course has been stable. There are no hypoglycemic associated symptoms. Pertinent negatives for diabetes include no blurred vision, no chest pain, no fatigue, no polydipsia, no polyphagia and no polyuria. There are no hypoglycemic complications. Symptoms are stable. There are no diabetic complications. Risk factors for coronary artery disease include sedentary lifestyle and diabetes mellitus. Current diabetic treatment includes oral agent (monotherapy). She is compliant with treatment all of the time.     Review of systems:   ROS negative except were noted in HPI.    Code Status: DNR-CC    /64   Pulse 77   Temp 36.2 °C (97.1 °F)   Resp 18   Ht 1.524 m (5')   Wt 70.8 kg (156 lb)   SpO2 94%   BMI 30.47 kg/m²      Physical Exam  Constitutional:       General: She is not in acute distress.  HENT:      Head: Normocephalic.      Nose: Nose normal. No congestion.      Mouth/Throat:      Mouth: Mucous membranes are moist.   Eyes:      Comments: L eye enucleation    Cardiovascular:      Rate and Rhythm: Normal rate and regular rhythm.      Pulses: Normal pulses.   Pulmonary:      Effort: Pulmonary effort is normal.      Breath sounds: Normal breath sounds.   Abdominal:      General: Bowel sounds are normal.      Palpations: Abdomen is soft.      Tenderness: There is no abdominal tenderness. There is no guarding.   Genitourinary:     Comments: Voiding   Musculoskeletal:          General: Normal range of motion.      Cervical back: Normal range of motion.   Lymphadenopathy:      Cervical: No cervical adenopathy.   Skin:     General: Skin is warm and dry.      Capillary Refill: Capillary refill takes less than 2 seconds.        Neurological:      General: No focal deficit present.      Mental Status: She is alert and oriented to person, place, and time.   Psychiatric:         Mood and Affect: Mood normal.         Behavior: Behavior normal.    Medications reviewed during visit at facility.  Synthroid 75 mcg po daily  Preser Vision AREDS one capsule po daily  Tylenol 650 mg po q 4 hours prn  Mirabegron ER 50 mg po daily  Metformin 500 mg po bid  Miralax 17 grams po daily  Biotin 1000 mcg po daily  MOM 30 ml po daily prn  Simvastatin 40 mg po daily  Gabapentin 100 mg po daily  Cymbalta 30 mg po daily  Oxycodone 7.5/325 mg po q 6 hours prn  Aspirin 81 mg po daily.  Vitamin D 3 2000 international units po daily  Primidone 50 mg 5 tablets po hs  Primidone 50 mg 2.5 tablets po daily  Ropinirole 3 mg po daily  Trazodone 25 mg po q hs  Mirtazapine 7.5 mg po daily  Hydroxyzine 50 mg po at 0800  Hydroxyzine 25 mg po evening prn.  Ipratropium-Albuterol Solution 0.5-2.5 (3) MG/3ML unit dose q 6 hours  Triamcinolone Acetonide Cream 0.1 % apply to chest bid  Hydrocortisone External Cream 1 % (Hydrocortisone apply topically q 8 hours prn.  Labs reviewed at facility:    Assessment/Plan   Problem List Items Addressed This Visit       DM2 (diabetes mellitus, type 2) (Multi) - Primary (Chronic)     Review blood sugar readings. Blood sugar today 128. Continue with Metformin 500 mg po bid.          Neurogenic bladder     Mirabegron ER 50 mg po daily.         Vitamin D deficiency     Vitamin D 3 2000 international units po daily            Time:    aJd Milan PA-C       Electronically Signed By: Jad Milan PA-C   6/22/24  8:24 PM

## 2024-06-22 VITALS
SYSTOLIC BLOOD PRESSURE: 122 MMHG | OXYGEN SATURATION: 94 % | WEIGHT: 156 LBS | DIASTOLIC BLOOD PRESSURE: 64 MMHG | TEMPERATURE: 97.1 F | RESPIRATION RATE: 18 BRPM | BODY MASS INDEX: 30.63 KG/M2 | HEIGHT: 60 IN | HEART RATE: 77 BPM

## 2024-06-22 ASSESSMENT — ENCOUNTER SYMPTOMS
FATIGUE: 0
POLYPHAGIA: 0
POLYDIPSIA: 0
BLURRED VISION: 0

## 2024-06-23 NOTE — PROGRESS NOTES
6/20/2024  Name: Maria Luz Martinez  YOB: 1935    Chief complaint: Follow up for diabetes.    HPI: Patient alert and cooperative with exam. Review of chart shows average blood sugar readings between 110-150 mg/dl. She is currently prescribed Metformin which has been well tolerated. Patient's weight has been stable. She has a CCD consistent carbohydrate diet.    Diabetes  She presents for her follow-up diabetic visit. She has type 2 diabetes mellitus. Her disease course has been stable. There are no hypoglycemic associated symptoms. Pertinent negatives for diabetes include no blurred vision, no chest pain, no fatigue, no polydipsia, no polyphagia and no polyuria. There are no hypoglycemic complications. Symptoms are stable. There are no diabetic complications. Risk factors for coronary artery disease include sedentary lifestyle and diabetes mellitus. Current diabetic treatment includes oral agent (monotherapy). She is compliant with treatment all of the time.     Review of systems:   ROS negative except were noted in HPI.    Code Status: DNR-CC    /64   Pulse 77   Temp 36.2 °C (97.1 °F)   Resp 18   Ht 1.524 m (5')   Wt 70.8 kg (156 lb)   SpO2 94%   BMI 30.47 kg/m²      Physical Exam  Constitutional:       General: She is not in acute distress.  HENT:      Head: Normocephalic.      Nose: Nose normal. No congestion.      Mouth/Throat:      Mouth: Mucous membranes are moist.   Eyes:      Comments: L eye enucleation    Cardiovascular:      Rate and Rhythm: Normal rate and regular rhythm.      Pulses: Normal pulses.   Pulmonary:      Effort: Pulmonary effort is normal.      Breath sounds: Normal breath sounds.   Abdominal:      General: Bowel sounds are normal.      Palpations: Abdomen is soft.      Tenderness: There is no abdominal tenderness. There is no guarding.   Genitourinary:     Comments: Voiding   Musculoskeletal:         General: Normal range of motion.      Cervical back: Normal range of  motion.   Lymphadenopathy:      Cervical: No cervical adenopathy.   Skin:     General: Skin is warm and dry.      Capillary Refill: Capillary refill takes less than 2 seconds.        Neurological:      General: No focal deficit present.      Mental Status: She is alert and oriented to person, place, and time.   Psychiatric:         Mood and Affect: Mood normal.         Behavior: Behavior normal.    Medications reviewed during visit at facility.  Synthroid 75 mcg po daily  Preser Vision AREDS one capsule po daily  Tylenol 650 mg po q 4 hours prn  Mirabegron ER 50 mg po daily  Metformin 500 mg po bid  Miralax 17 grams po daily  Biotin 1000 mcg po daily  MOM 30 ml po daily prn  Simvastatin 40 mg po daily  Gabapentin 100 mg po daily  Cymbalta 30 mg po daily  Oxycodone 7.5/325 mg po q 6 hours prn  Aspirin 81 mg po daily.  Vitamin D 3 2000 international units po daily  Primidone 50 mg 5 tablets po hs  Primidone 50 mg 2.5 tablets po daily  Ropinirole 3 mg po daily  Trazodone 25 mg po q hs  Mirtazapine 7.5 mg po daily  Hydroxyzine 50 mg po at 0800  Hydroxyzine 25 mg po evening prn.  Ipratropium-Albuterol Solution 0.5-2.5 (3) MG/3ML unit dose q 6 hours  Triamcinolone Acetonide Cream 0.1 % apply to chest bid  Hydrocortisone External Cream 1 % (Hydrocortisone apply topically q 8 hours prn.  Labs reviewed at facility:    Assessment/Plan    Problem List Items Addressed This Visit       DM2 (diabetes mellitus, type 2) (Multi) - Primary (Chronic)     Review blood sugar readings. Blood sugar today 128. Continue with Metformin 500 mg po bid.          Neurogenic bladder     Mirabegron ER 50 mg po daily.         Vitamin D deficiency     Vitamin D 3 2000 international units po daily            Time:    Jad Milan PA-C

## 2024-08-20 ENCOUNTER — NURSING HOME VISIT (OUTPATIENT)
Dept: POST ACUTE CARE | Facility: EXTERNAL LOCATION | Age: 89
End: 2024-08-20
Payer: MEDICARE

## 2024-08-20 DIAGNOSIS — N30.00 ACUTE CYSTITIS WITHOUT HEMATURIA: ICD-10-CM

## 2024-08-20 DIAGNOSIS — E11.40 TYPE 2 DIABETES MELLITUS WITH DIABETIC NEUROPATHY, UNSPECIFIED WHETHER LONG TERM INSULIN USE (MULTI): Chronic | ICD-10-CM

## 2024-08-20 DIAGNOSIS — R21 RASH: Primary | ICD-10-CM

## 2024-08-20 PROCEDURE — 99308 SBSQ NF CARE LOW MDM 20: CPT | Performed by: PHYSICIAN ASSISTANT

## 2024-08-20 NOTE — LETTER
Patient: Maria Luz Martinez  : 1935    Encounter Date: 2024  Name: Maria Luz Martinez  YOB: 1935    Chief complaint: Pruritus. Rash to chest and back.    HPI: Patient will have appointment with [facility] today to evaluate persistent rash to chest and back.   She has tried several treatments including Triamcinolone and Clobetasol cream which were recommended by Dermatology service during previous visits.     Rash  The current episode started more than 1 month ago. The problem has been waxing and waning since onset. The affected locations include the chest and back. The rash is characterized by itchiness. She was exposed to nothing. Pertinent negatives include no anorexia, congestion, cough, diarrhea, facial edema, fever, joint pain or shortness of breath. Past treatments include topical steroids. The treatment provided mild relief.     Review of systems:   ROS negative except were noted in HPI.    Code Status: DNR-CC    /74   Pulse 76   Temp 36.4 °C (97.6 °F)   Resp 18   Ht 1.524 m (5')   Wt 70.3 kg (155 lb)   SpO2 96%   BMI 30.27 kg/m²      Physical Exam  Constitutional:       General: She is not in acute distress.  HENT:      Head: Normocephalic.      Nose: Nose normal. No congestion.      Mouth/Throat:      Mouth: Mucous membranes are moist.   Eyes:      Comments: L eye enucleation    Cardiovascular:      Rate and Rhythm: Normal rate and regular rhythm.      Pulses: Normal pulses.   Pulmonary:      Effort: Pulmonary effort is normal.      Breath sounds: Normal breath sounds.   Abdominal:      General: Bowel sounds are normal.      Palpations: Abdomen is soft.      Tenderness: There is no abdominal tenderness. There is no guarding.   Genitourinary:     Comments: Voiding   Musculoskeletal:         General: Normal range of motion.      Cervical back: Normal range of motion.   Lymphadenopathy:      Cervical: No cervical adenopathy.   Skin:     General: Skin is  warm and dry.      Capillary Refill: Capillary refill takes less than 2 seconds.      Reddened bumps to chest.   Neurological:      General: No focal deficit present.      Mental Status: She is alert and oriented to person, place, and time.   Psychiatric:         Mood and Affect: Mood normal.         Behavior: Behavior normal.     Medications reviewed during visit at facility.  Synthroid 75 mcg po daily  Preser Vision AREDS one capsule po daily  Tylenol 650 mg po q 4 hours prn  Mirabegron ER 50 mg po daily  Metformin 500 mg po bid  Miralax 17 grams po daily  Biotin 1000 mcg po daily  MOM 30 ml po daily prn  Simvastatin 40 mg po daily  Gabapentin 100 mg po daily  Cymbalta 30 mg po daily  Oxycodone 7.5/325 mg po q 6 hours prn  Aspirin 81 mg po daily.  Vitamin D 3 2000 international units po daily  Primidone 50 mg 5 tablets po hs  Primidone 50 mg 2.5 tablets po daily  Ropinirole 3 mg po daily  Trazodone 25 mg po q hs  Mirtazapine 7.5 mg po daily  Hydroxyzine 50 mg po at 0800  Hydroxyzine 25 mg po evening prn.  Ipratropium-Albuterol Solution 0.5-2.5 (3) MG/3ML unit dose q 6 hours  Nitrofurantoin Macrocrystal Oral Capsule 50 mg po q 6 hour x 7 days  Labs reviewed at facility:    Laboratory: 2024 08:04 URINALYSIS / CULTURE, URINE  Latest Version (more available)  Reviewed by steffanie on 2024 18:54  Resident Information  Resident: Maria Luz Rodgers ([id number] NF)  Admit Date: 2021  Admitting Provider:   Attending Provider:   Copy to List:   Report Information  Collection Date: 8/15/2024 00:00  Received Date: 8/15/2024 07:47  Reported Date: 2024 08:04  Ord. Provider: Jerry Torres  Source [place]: [id number]  Lab Information  Status: Completed  Flag:    Reporting Lab:   [facility]  Order #:   [id number]  Vendor Order #:   [id number]  Category:   Chemistry, Unknown Category  Order Notes  Result for:  MARIA LUZ RODGERS  ( 1935, F)         Results   Show All Details Result Units Ref. Range Flag Status    URINALYSIS                         Grading for Epith Cells,Bact,Cast                                NEGATIVE  = 0  - 2                                  FEW       = 3  - 5                                 MODERATE  = 6 -  20                                  MANY      = 21 - 50                                 TNTC      = >50       COLOR  LIGHT YELLOW  YELLOW  Final           CLARITY  TUBID  CLEAR A Final           GLUCOSE,UR  NORMAL  NEGATIVE A Final           BILIRUBIN,UR  NEGATIVE  NEGATIVE  Final           KETONES,UR  NEGATIVE  NEGATIVE  Final           SPECIFIC GRAVITY  1.006  1.001-1.030  Final           BLOOD,UR  NEGATIVE  NEGATIVE  Final           PH, URINE  7.0  5.0-8.5  Final           PROTEIN,UR  NEGATIVE  NEGATIVE  Final           UROBILINOGEN,UR  NEGATIVE  NEGATIVE  Final           NITRITE,UR  POSITIVE  NEGATIVE A Final           LEUKOCYTES,UR  4+  NEGATIVE A Final           RBC,UR  0-2 /HPF <6  Final           WBC,UR  >50 /HPF <6 A Final           BACTERIA,UR  TNTC  NEGATIVE A Final           MUCOUS  PRESENT  ABSENT A Final           WBC CLUMPS  PRESENT  ABSENT A Final       CULTURE, URINE  .    Final                          (Final)                      Source: URINE                      >100,000 CFU/mL ESCHERICHIA COLI ESBL                      CONFIRMED ESBL PRODUCING ORGANISM                      EXTENDED-SPECTRUM BETA-LACTAMASES (ESBLS) ARE ENZYMES THAT MEDIATE                       RESISTANCE TO EXTENDED-SPECTRUM (THIRD GENERATION) CEPHALOSPORINS                       (E.G., CEFTAZIDIME, CEFOTAXIME, AND CEFTRIAXONE) AND MONOBACTAMS                       (E.G., AZTREONAM) BUT DO NOT AFFECT CEPHAMYCINS (E.G., CEFOXITIN AND                       CEFOTETAN) OR CARBAPENEMS (E.G., MEROPENEM OR IMIPENEM). RECOMMEND                       CAUTION AND MONITORING OF PATIENTS DURING/AFTER THERAPY.                      Sensitivity                            E. COLI ESBL     AMP/SULBACTAM       I 16/8                 AMPICILLIN          R >16                BACTRIM (TRIMETH/S  S <=2/38              CEFAZOLIN           R 8                  CEFTAZIDIME/AVIBAC  S <=8                 CEFTRIAXONE         R* >2                 CIPROFLOXICIN       R >2                  GENTAMICIN          R >8                  LEVOFLOXACIN        R >4                  MEROPENEM           S <=1                 MEROPENEM/VABORBAC  S <=2                 NITROFURANTOIN      S <=32                PIPERACILLIN/TAZOB  S <=8                 TOBRAMYCIN          S 4  Assessment/Plan   Problem List Items Addressed This Visit       DM2 (diabetes mellitus, type 2) (Multi) (Chronic)     Review blood sugar readings. Blood sugar today 141 Continue with Metformin 500 mg po bid.             Acute cystitis without hematuria     Started on Nitrofurantoin Macrocrystal Oral Capsule 50 mg po q 6 hour x 7 days         Rash - Primary     Scheduled for appointment with [facility] on 8/20/2024.             Time:    Jad Milan PA-C       Electronically Signed By: Jad Milan PA-C   8/21/24 11:10 PM

## 2024-08-21 VITALS
RESPIRATION RATE: 18 BRPM | WEIGHT: 155 LBS | DIASTOLIC BLOOD PRESSURE: 74 MMHG | HEIGHT: 60 IN | HEART RATE: 76 BPM | TEMPERATURE: 97.6 F | SYSTOLIC BLOOD PRESSURE: 135 MMHG | BODY MASS INDEX: 30.43 KG/M2 | OXYGEN SATURATION: 96 %

## 2024-08-21 ASSESSMENT — ENCOUNTER SYMPTOMS
DIARRHEA: 0
FEVER: 0
ANOREXIA: 0
COUGH: 0
SHORTNESS OF BREATH: 0

## 2024-08-22 NOTE — PROGRESS NOTES
8/20/2024  Name: Maria Luz Martinez  YOB: 1935    Chief complaint: Pruritus. Rash to chest and back.    HPI: Patient will have appointment with [facility] service today to evaluate persistent rash to chest and back.   She has tried several treatments including Triamcinolone and Clobetasol cream which were recommended by Dermatology service during previous visits.     Rash  The current episode started more than 1 month ago. The problem has been waxing and waning since onset. The affected locations include the chest and back. The rash is characterized by itchiness. She was exposed to nothing. Pertinent negatives include no anorexia, congestion, cough, diarrhea, facial edema, fever, joint pain or shortness of breath. Past treatments include topical steroids. The treatment provided mild relief.     Review of systems:   ROS negative except were noted in HPI.    Code Status: DNR-CC    /74   Pulse 76   Temp 36.4 °C (97.6 °F)   Resp 18   Ht 1.524 m (5')   Wt 70.3 kg (155 lb)   SpO2 96%   BMI 30.27 kg/m²      Physical Exam  Constitutional:       General: She is not in acute distress.  HENT:      Head: Normocephalic.      Nose: Nose normal. No congestion.      Mouth/Throat:      Mouth: Mucous membranes are moist.   Eyes:      Comments: L eye enucleation    Cardiovascular:      Rate and Rhythm: Normal rate and regular rhythm.      Pulses: Normal pulses.   Pulmonary:      Effort: Pulmonary effort is normal.      Breath sounds: Normal breath sounds.   Abdominal:      General: Bowel sounds are normal.      Palpations: Abdomen is soft.      Tenderness: There is no abdominal tenderness. There is no guarding.   Genitourinary:     Comments: Voiding   Musculoskeletal:         General: Normal range of motion.      Cervical back: Normal range of motion.   Lymphadenopathy:      Cervical: No cervical adenopathy.   Skin:     General: Skin is warm and dry.      Capillary Refill: Capillary refill takes less than 2  seconds.      Reddened bumps to chest.   Neurological:      General: No focal deficit present.      Mental Status: She is alert and oriented to person, place, and time.   Psychiatric:         Mood and Affect: Mood normal.         Behavior: Behavior normal.     Medications reviewed during visit at facility.  Synthroid 75 mcg po daily  Preser Vision AREDS one capsule po daily  Tylenol 650 mg po q 4 hours prn  Mirabegron ER 50 mg po daily  Metformin 500 mg po bid  Miralax 17 grams po daily  Biotin 1000 mcg po daily  MOM 30 ml po daily prn  Simvastatin 40 mg po daily  Gabapentin 100 mg po daily  Cymbalta 30 mg po daily  Oxycodone 7.5/325 mg po q 6 hours prn  Aspirin 81 mg po daily.  Vitamin D 3 2000 international units po daily  Primidone 50 mg 5 tablets po hs  Primidone 50 mg 2.5 tablets po daily  Ropinirole 3 mg po daily  Trazodone 25 mg po q hs  Mirtazapine 7.5 mg po daily  Hydroxyzine 50 mg po at 0800  Hydroxyzine 25 mg po evening prn.  Ipratropium-Albuterol Solution 0.5-2.5 (3) MG/3ML unit dose q 6 hours  Nitrofurantoin Macrocrystal Oral Capsule 50 mg po q 6 hour x 7 days  Labs reviewed at facility:    Laboratory: 2024 08:04 URINALYSIS / CULTURE, URINE  Latest Version (more available)  Reviewed by steffanie on 2024 18:54  Resident Information  Resident: Maria Luz Rodgers ([id number])  Admit Date: 2021  Admitting Provider:   Attending Provider:   Copy to List:   Report Information  Collection Date: 8/15/2024 00:00  Received Date: 8/15/2024 07:47  Reported Date: 2024 08:04  Ord. Provider: Jerry Torres  Source [place]: [id number]  Lab Information  Status: Completed  Flag:    Reporting Lab:   [facility]  Order #:   [id number]  Vendor Order #:   [id number]  Category:   Chemistry, Unknown Category  Order Notes  Result for:  MARIA LUZ RODGERS  ( 1935, F)         Results   Show All Details Result Units Ref. Range Flag Status   URINALYSIS                         Grading for Epith Cells,Bact,Cast                                 NEGATIVE  = 0  - 2                                  FEW       = 3  - 5                                 MODERATE  = 6 -  20                                  MANY      = 21 - 50                                 TNTC      = >50       COLOR  LIGHT YELLOW  YELLOW  Final           CLARITY  TUBID  CLEAR A Final           GLUCOSE,UR  NORMAL  NEGATIVE A Final           BILIRUBIN,UR  NEGATIVE  NEGATIVE  Final           KETONES,UR  NEGATIVE  NEGATIVE  Final           SPECIFIC GRAVITY  1.006  1.001-1.030  Final           BLOOD,UR  NEGATIVE  NEGATIVE  Final           PH, URINE  7.0  5.0-8.5  Final           PROTEIN,UR  NEGATIVE  NEGATIVE  Final           UROBILINOGEN,UR  NEGATIVE  NEGATIVE  Final           NITRITE,UR  POSITIVE  NEGATIVE A Final           LEUKOCYTES,UR  4+  NEGATIVE A Final           RBC,UR  0-2 /HPF <6  Final           WBC,UR  >50 /HPF <6 A Final           BACTERIA,UR  TNTC  NEGATIVE A Final           MUCOUS  PRESENT  ABSENT A Final           WBC CLUMPS  PRESENT  ABSENT A Final       CULTURE, URINE  .    Final                          (Final)                      Source: URINE                      >100,000 CFU/mL ESCHERICHIA COLI ESBL                      CONFIRMED ESBL PRODUCING ORGANISM                      EXTENDED-SPECTRUM BETA-LACTAMASES (ESBLS) ARE ENZYMES THAT MEDIATE                       RESISTANCE TO EXTENDED-SPECTRUM (THIRD GENERATION) CEPHALOSPORINS                       (E.G., CEFTAZIDIME, CEFOTAXIME, AND CEFTRIAXONE) AND MONOBACTAMS                       (E.G., AZTREONAM) BUT DO NOT AFFECT CEPHAMYCINS (E.G., CEFOXITIN AND                       CEFOTETAN) OR CARBAPENEMS (E.G., MEROPENEM OR IMIPENEM). RECOMMEND                       CAUTION AND MONITORING OF PATIENTS DURING/AFTER THERAPY.                      Sensitivity                            E. COLI ESBL     AMP/SULBACTAM       I 16/8                AMPICILLIN          R >16                BACTRIM (TRIMETH/S  S <=2/38               CEFAZOLIN           R 8                  CEFTAZIDIME/AVIBAC  S <=8                 CEFTRIAXONE         R* >2                 CIPROFLOXICIN       R >2                  GENTAMICIN          R >8                  LEVOFLOXACIN        R >4                  MEROPENEM           S <=1                 MEROPENEM/VABORBAC  S <=2                 NITROFURANTOIN      S <=32                PIPERACILLIN/TAZOB  S <=8                 TOBRAMYCIN          S 4  Assessment/Plan    Problem List Items Addressed This Visit       DM2 (diabetes mellitus, type 2) (Multi) (Chronic)     Review blood sugar readings. Blood sugar today 141 Continue with Metformin 500 mg po bid.             Acute cystitis without hematuria     Started on Nitrofurantoin Macrocrystal Oral Capsule 50 mg po q 6 hour x 7 days         Rash - Primary     Scheduled for appointment with [facility] on 8/20/2024.             Time:    Jad Milan PA-C

## 2024-09-06 ENCOUNTER — NURSING HOME VISIT (OUTPATIENT)
Dept: POST ACUTE CARE | Facility: EXTERNAL LOCATION | Age: 89
End: 2024-09-06
Payer: MEDICARE

## 2024-09-06 DIAGNOSIS — R21 RASH: ICD-10-CM

## 2024-09-06 DIAGNOSIS — G62.9 NEUROPATHY: Primary | ICD-10-CM

## 2024-09-06 DIAGNOSIS — G25.81 RESTLESS LEG SYNDROME: ICD-10-CM

## 2024-09-06 PROCEDURE — 99308 SBSQ NF CARE LOW MDM 20: CPT | Performed by: STUDENT IN AN ORGANIZED HEALTH CARE EDUCATION/TRAINING PROGRAM

## 2024-09-06 NOTE — PROGRESS NOTES
Patient seen at [facility].      HPI:  This is a 87 yo F who is a LTC resident.      Patient has no new complaints at this time. Seen at bedside. Feeling fine. Denies pain. Not sure about her skin rash. Patient is participating in therapy sessions.     ROS:  12-pt ROS was reviewed with patient and was negative, unless otherwise noted in HPI.     PMHx; SocHx; FamHx; Allergies; Medications: all reviewed, see CNP/PA notes, EMR, and records for further details.     LABS: available labs were reviewed.     VITALS: vitals reviewed, see EMR for further details.     PHYSICAL EXAM:  GEN: calm, no acute distress  HEENT: PER, EOMI, MMM. Left eye enucleation in the past.  NECK: supple  CV: S1, S2, RRR  PULM: unlabored breathing, CTAB  ABD: soft, NT  Neuro: no new gross focal deficits  PSYCH: appropriate affect  SKIN: petechial rash upper and lower extremities     ASSESSMENT:  Petechial rash, recent visit w/ dermatologist  Vascular dementia w/ behavioral disturbance  Restless leg syndrome  Benign essential tremor  Diabetes type II  Chronic pain  Neuropathy      PLAN:  PT/OT/ST as indicated.  Prior records and hospital notes reviewed.  I agree with plan of care as detailed in CNP/PA note.  Fall precautions.  I discussed case with nursing staff.  Advised close PCP fu as outpatient as indicated.    Mercy Turpin MD    Trainee role: Resident    I saw and evaluated the patient. I personally obtained the key and critical portions of the history and physical exam or was physically present for key and critical portions performed by the trainee. I reviewed the trainee's documentation and discussed the patient with the trainee. I agree with the trainee's medical decision making as documented on the trainee's notes.    Jerry Torres M.D.

## 2024-09-06 NOTE — LETTER
Patient: Maria Luz Martinez  : 1935    Encounter Date: 2024    Patient seen at [facility].      HPI:  This is a 89 yo F who is a LTC resident.      Patient has no new complaints at this time. Seen at bedside. Feeling fine. Denies pain. Not sure about her skin rash. Patient is participating in therapy sessions.     ROS:  12-pt ROS was reviewed with patient and was negative, unless otherwise noted in HPI.     PMHx; SocHx; FamHx; Allergies; Medications: all reviewed, see CNP/PA notes, EMR, and records for further details.     LABS: available labs were reviewed.     VITALS: vitals reviewed, see EMR for further details.     PHYSICAL EXAM:  GEN: calm, no acute distress  HEENT: PER, EOMI, MMM. Left eye enucleation in the past.  NECK: supple  CV: S1, S2, RRR  PULM: unlabored breathing, CTAB  ABD: soft, NT  Neuro: no new gross focal deficits  PSYCH: appropriate affect  SKIN: petechial rash upper and lower extremities     ASSESSMENT:  Petechial rash, recent visit w/ dermatologist  Vascular dementia w/ behavioral disturbance  Restless leg syndrome  Benign essential tremor  Diabetes type II  Chronic pain  Neuropathy      PLAN:  PT/OT/ST as indicated.  Prior records and hospital notes reviewed.  I agree with plan of care as detailed in CNP/PA note.  Fall precautions.  I discussed case with nursing staff.  Advised close PCP fu as outpatient as indicated.    Mercy Turpin MD    Trainee role: Resident    I saw and evaluated the patient. I personally obtained the key and critical portions of the history and physical exam or was physically present for key and critical portions performed by the trainee. I reviewed the trainee's documentation and discussed the patient with the trainee. I agree with the trainee's medical decision making as documented on the trainee's notes.    Jerry Torres M.D.          Electronically Signed By: Jerry Torres MD   24 12:07 PM

## 2024-10-01 ENCOUNTER — NURSING HOME VISIT (OUTPATIENT)
Dept: POST ACUTE CARE | Facility: EXTERNAL LOCATION | Age: 89
End: 2024-10-01
Payer: MEDICARE

## 2024-10-01 DIAGNOSIS — G25.81 RESTLESS LEG SYNDROME: ICD-10-CM

## 2024-10-01 DIAGNOSIS — R21 RASH: Primary | ICD-10-CM

## 2024-10-01 DIAGNOSIS — G25.0 BENIGN ESSENTIAL TREMOR: ICD-10-CM

## 2024-10-01 DIAGNOSIS — F33.9 MAJOR DEPRESSION, RECURRENT, CHRONIC (CMS-HCC): ICD-10-CM

## 2024-10-01 PROCEDURE — 99309 SBSQ NF CARE MODERATE MDM 30: CPT | Performed by: PHYSICIAN ASSISTANT

## 2024-10-01 NOTE — LETTER
Patient: Maria Luz Martinez  : 1935    Encounter Date: 10/01/2024    10/1/2024  Name: Maria Luz Martinez  YOB: 1935    Chief complaint: Follow up for  red/raised spots scattered along the inside of left upper arm. left breast    HPI: Patient has red/raise rash noted to L arm and left breast area. Family was provided with a prescription for permetherin cream from [facility] . Call was placed to [facility] to confirm prescription and use. Patient previously treated with Prednisone taper for dermatitis in Aug. 2024. Patient denies fever, chills, or pruritus.    Review of systems:   ROS negative except were noted in HPI.    Code Status: DNR-CC    /68   Pulse 72   Temp 36.7 °C (98.1 °F)   Resp 18   Ht 1.524 m (5')   Wt 71.2 kg (157 lb)   SpO2 98%   BMI 30.66 kg/m²      Physical Exam  Constitutional:       General: She is not in acute distress.  HENT:      Head: Normocephalic.      Nose: Nose normal. No congestion.      Mouth/Throat:      Mouth: Mucous membranes are moist.   Eyes:      Comments: L eye enucleation    Cardiovascular:      Rate and Rhythm: Normal rate and regular rhythm.      Pulses: Normal pulses.   Pulmonary:      Effort: Pulmonary effort is normal.      Breath sounds: Normal breath sounds.   Abdominal:      General: Bowel sounds are normal.      Palpations: Abdomen is soft.      Tenderness: There is no abdominal tenderness. There is no guarding.   Genitourinary:     Comments: Voiding   Musculoskeletal:         General: Normal range of motion.      Cervical back: Normal range of motion.   Lymphadenopathy:      Cervical: No cervical adenopathy.   Skin:     General: Skin is warm and dry.      Capillary Refill: Capillary refill takes less than 2 seconds.      Reddened bumps left arm and L breast.  Neurological:      General: No focal deficit present.      Mental Status: She is alert and oriented to person, place, and time.   Psychiatric:         Mood and Affect: Mood  normal.         Behavior: Behavior normal.     Medications reviewed during visit at facility.  Synthroid 75 mcg po daily  Preser Vision AREDS one capsule po daily  Tylenol 650 mg po q 4 hours prn  Mirabegron ER 50 mg po daily  Metformin 500 mg po bid  Miralax 17 grams po daily  Biotin 1000 mcg po daily  MOM 30 ml po daily prn  Simvastatin 40 mg po daily  Gabapentin 100 mg po daily  Cymbalta 30 mg po daily  Oxycodone 7.5/325 mg po q 6 hours prn  Aspirin 81 mg po daily.  Vitamin D 3 2000 international units po daily  Primidone 50 mg 5 tablets po hs  Primidone 50 mg 2.5 tablets po daily  Ropinirole 3 mg po daily  Trazodone 25 mg po q hs  Mirtazapine 7.5 mg po daily  CeraVe Diabetics Dry Skin External Cream apply to legs and arms bid  Clobetasol Propionate Cream 0.05 % apply to legs and arms bid  Hydroxyzine 25 mg po q 8 hours prn.  Ipratropium-Albuterol Solution 0.5-2.5 (3) MG/3ML unit dose q 6 hours  Permethrin External Cream 5 % (Permethrin) apply to affected area. Leave on for 8-14 hours then wash off in shower or bath. Repeat in one week.  Labs reviewed at facility:    Assessment/Plan   Problem List Items Addressed This Visit       Restless leg syndrome     Ropinirole 3 mg po daily          Benign essential tremor     Primidone 50 mg 5 tablets po hs. Primidone 50 mg 2.5 tablets po daily.          Major depression, recurrent, chronic (CMS-HCC)     Cymbalta 30 mg po daily         Rash - Primary     [facility] recommending Permethrin External Cream 5 % (Permethrin) apply to affected area. Leave on for 8-14 hours then wash off in shower or bath. Repeat in one week.            Time:    Jad Milan PA-C       Electronically Signed By: Jad Milan PA-C   10/3/24 11:29 PM

## 2024-10-03 VITALS
OXYGEN SATURATION: 98 % | WEIGHT: 157 LBS | BODY MASS INDEX: 30.82 KG/M2 | DIASTOLIC BLOOD PRESSURE: 68 MMHG | SYSTOLIC BLOOD PRESSURE: 112 MMHG | RESPIRATION RATE: 18 BRPM | TEMPERATURE: 98.1 F | HEART RATE: 72 BPM | HEIGHT: 60 IN

## 2024-10-04 NOTE — ASSESSMENT & PLAN NOTE
[facility] recommending Permethrin External Cream 5 % (Permethrin) apply to affected area. Leave on for 8-14 hours then wash off in shower or bath. Repeat in one week.

## 2024-10-04 NOTE — PROGRESS NOTES
10/1/2024  Name: Maria Luz Martinze  YOB: 1935    Chief complaint: Follow up for  red/raised spots scattered along the inside of left upper arm. left breast    HPI: Patient has red/raise rash noted to L arm and left breast area. Family was provided with a prescription for permetherin cream from [facility] . Call was placed to [facility] to confirm prescription and use. Patient previously treated with Prednisone taper for dermatitis in Aug. 2024. Patient denies fever, chills, or pruritus.    Review of systems:   ROS negative except were noted in HPI.    Code Status: DNR-CC    /68   Pulse 72   Temp 36.7 °C (98.1 °F)   Resp 18   Ht 1.524 m (5')   Wt 71.2 kg (157 lb)   SpO2 98%   BMI 30.66 kg/m²      Physical Exam  Constitutional:       General: She is not in acute distress.  HENT:      Head: Normocephalic.      Nose: Nose normal. No congestion.      Mouth/Throat:      Mouth: Mucous membranes are moist.   Eyes:      Comments: L eye enucleation    Cardiovascular:      Rate and Rhythm: Normal rate and regular rhythm.      Pulses: Normal pulses.   Pulmonary:      Effort: Pulmonary effort is normal.      Breath sounds: Normal breath sounds.   Abdominal:      General: Bowel sounds are normal.      Palpations: Abdomen is soft.      Tenderness: There is no abdominal tenderness. There is no guarding.   Genitourinary:     Comments: Voiding   Musculoskeletal:         General: Normal range of motion.      Cervical back: Normal range of motion.   Lymphadenopathy:      Cervical: No cervical adenopathy.   Skin:     General: Skin is warm and dry.      Capillary Refill: Capillary refill takes less than 2 seconds.      Reddened bumps left arm and L breast.  Neurological:      General: No focal deficit present.      Mental Status: She is alert and oriented to person, place, and time.   Psychiatric:         Mood and Affect: Mood normal.         Behavior: Behavior normal.     Medications reviewed during  visit at facility.  Synthroid 75 mcg po daily  Preser Vision AREDS one capsule po daily  Tylenol 650 mg po q 4 hours prn  Mirabegron ER 50 mg po daily  Metformin 500 mg po bid  Miralax 17 grams po daily  Biotin 1000 mcg po daily  MOM 30 ml po daily prn  Simvastatin 40 mg po daily  Gabapentin 100 mg po daily  Cymbalta 30 mg po daily  Oxycodone 7.5/325 mg po q 6 hours prn  Aspirin 81 mg po daily.  Vitamin D 3 2000 international units po daily  Primidone 50 mg 5 tablets po hs  Primidone 50 mg 2.5 tablets po daily  Ropinirole 3 mg po daily  Trazodone 25 mg po q hs  Mirtazapine 7.5 mg po daily  CeraVe Diabetics Dry Skin External Cream apply to legs and arms bid  Clobetasol Propionate Cream 0.05 % apply to legs and arms bid  Hydroxyzine 25 mg po q 8 hours prn.  Ipratropium-Albuterol Solution 0.5-2.5 (3) MG/3ML unit dose q 6 hours  Permethrin External Cream 5 % (Permethrin) apply to affected area. Leave on for 8-14 hours then wash off in shower or bath. Repeat in one week.  Labs reviewed at facility:    Assessment/Plan    Problem List Items Addressed This Visit       Restless leg syndrome     Ropinirole 3 mg po daily          Benign essential tremor     Primidone 50 mg 5 tablets po hs. Primidone 50 mg 2.5 tablets po daily.          Major depression, recurrent, chronic (CMS-HCC)     Cymbalta 30 mg po daily         Rash - Primary     [facility] recommending Permethrin External Cream 5 % (Permethrin) apply to affected area. Leave on for 8-14 hours then wash off in shower or bath. Repeat in one week.            Time:    Jad Milan PA-C

## 2024-10-24 ENCOUNTER — NURSING HOME VISIT (OUTPATIENT)
Dept: POST ACUTE CARE | Facility: EXTERNAL LOCATION | Age: 89
End: 2024-10-24
Payer: MEDICARE

## 2024-10-24 DIAGNOSIS — N30.00 ACUTE CYSTITIS WITHOUT HEMATURIA: Primary | ICD-10-CM

## 2024-10-24 DIAGNOSIS — F41.1 GENERALIZED ANXIETY DISORDER: ICD-10-CM

## 2024-10-24 DIAGNOSIS — G25.0 BENIGN ESSENTIAL TREMOR: ICD-10-CM

## 2024-10-24 DIAGNOSIS — N31.9 NEUROGENIC BLADDER: ICD-10-CM

## 2024-10-24 PROCEDURE — 99309 SBSQ NF CARE MODERATE MDM 30: CPT | Performed by: PHYSICIAN ASSISTANT

## 2024-10-24 NOTE — LETTER
Patient: Maria Luz Martinez  : 1935    Encounter Date: 10/24/2024    10/24/2024  Name: Maria Luz Martinez  YOB: 1935    Chief complaint: UTI    HPI: Patient was exhibiting signs of increased confusion two days ago prompting evaluation for UTI. Family requesting UA/CS. Patient has underlying dementia and become acutely confused when UTI is present. Review of lab work suggestive for E coli UTI. She was placed on appropriate antibiotics.    UTI   This is a recurrent problem. The current episode started in the past 7 days. The problem has been gradually improving. The patient is experiencing no pain. There has been no fever. Pertinent negatives include no chills, discharge, flank pain or hematuria. She has tried antibiotics for the symptoms. The treatment provided moderate relief. Her past medical history is significant for recurrent UTIs.     Review of systems:   ROS negative except were noted in HPI.    Code Status: DNR-CC    /68   Pulse 71   Temp 36.9 °C (98.5 °F)   Resp 18   Ht 1.524 m (5')   Wt 71.2 kg (157 lb)   SpO2 95%   BMI 30.66 kg/m²      Physical Exam  Constitutional:       General: She is not in acute distress.  HENT:      Head: Normocephalic.      Nose: Nose normal. No congestion.      Mouth/Throat:      Mouth: Mucous membranes are moist.   Eyes:      Comments: L eye enucleation    Cardiovascular:      Rate and Rhythm: Normal rate and regular rhythm.      Pulses: Normal pulses.   Pulmonary:      Effort: Pulmonary effort is normal.      Breath sounds: Normal breath sounds.   Abdominal:      General: Bowel sounds are normal.      Palpations: Abdomen is soft.      Tenderness: There is no abdominal tenderness. There is no guarding.   Genitourinary:     Comments: Voiding   Musculoskeletal:         General: Normal range of motion.      Cervical back: Normal range of motion.   Lymphadenopathy:      Cervical: No cervical adenopathy.   Skin:     General: Skin is warm and dry.      Capillary  Refill: Capillary refill takes less than 2 seconds.   Neurological:      General: No focal deficit present.      Mental Status: She is alert and oriented to person, place, and time.   Psychiatric:         Mood and Affect: Mood normal.         Behavior: Behavior normal.     Medications reviewed during visit at facility.  Synthroid 75 mcg po daily  Preser Vision AREDS one capsule po daily  Tylenol 650 mg po q 4 hours prn  Mirabegron ER 50 mg po daily  Metformin 500 mg po bid  Miralax 17 grams po daily  Biotin 1000 mcg po daily  MOM 30 ml po daily prn  Simvastatin 40 mg po daily  Gabapentin 100 mg po daily  Cymbalta 30 mg po daily  Oxycodone 7.5/325 mg po q 6 hours prn  Aspirin 81 mg po daily.  Vitamin D 3 2000 international units po daily  Primidone 50 mg 5 tablets po hs  Primidone 50 mg 4 tablets po daily  Ropinirole 3 mg po daily  Trazodone 25 mg po q hs  Mirtazapine 7.5 mg po daily  CeraVe Diabetics Dry Skin External Cream apply to legs and arms bid  Clobetasol Propionate Cream 0.05 % apply to legs and arms bid  Hydroxyzine 25 mg po q 8 hours prn.  Ipratropium-Albuterol Solution 0.5-2.5 (3) MG/3ML unit dose q 6 hours  Macrobid 100 mg po q 12 hours x 7 days.  Labs reviewed at facility:    Laboratory: 10/24/2024 12:02 UA W/CULTURE IF INDICATED / CULTURE, URINE  Latest Version (more available)  Reviewed by steffanie on 10/24/2024 17:56  Resident Information  Resident: Michelle Maria Luz ([id number])  Admit Date: 2021  Admitting Provider:   Attending Provider:   Copy to List:   Report Information  Collection Date: 10/22/2024 00:00  Received Date: 10/22/2024 09:10  Reported Date: 10/24/2024 12:02  Ord. Provider: Jerry Torres  Source [place]: [id number]  Lab Information  Status: Completed  Flag:    Reporting Lab:   [facility]  Order #:   [id number]  Vendor Order #:   [id number]  Category:   Chemistry, Unknown Category  Order Notes  Result for:  MARIA LUZ RODGERS  ( 1935, F)         Results   Show All  Details Result Units Ref. Range Flag Status   UA W/CULTURE IF INDICATED             Grading for Epith Cells,Bact,Cast                      NEGATIVE  = 0  - 2                        FEW       = 3  - 10                       MODERATE  = 11 - 30                       MANY      = 31 - 60                       TNTC      = >61                                                             Criteria for a Urine Culture to be reflexed are:                      WBC >5/hpf                      RBC >5/hpf                   NITRITES =POSITIVE       COLOR  YELLOW  YELLOW  Final           CLARITY  TUBID  CLEAR A Final           GLUCOSE,UR  NORMAL  NEGATIVE A Final           BILIRUBIN,UR  NEGATIVE  NEGATIVE  Final           KETONES,UR  NEGATIVE  NEGATIVE  Final           SPECIFIC GRAVITY  1.025  1.001-1.030  Final           BLOOD,UR  NEGATIVE  NEGATIVE  Final           PH, URINE  5.5  5.0-8.5  Final           PROTEIN,UR  TRACE  NEGATIVE A Final           UROBILINOGEN,UR  NEGATIVE  NEGATIVE  Final           NITRITE,UR  POSITIVE  NEGATIVE A Final           LEUKOCYTES,UR  4+  NEGATIVE A Final           RBC,UR  6-20 /HPF <6 A Final      URINALYSIS RESULTS MEET CRITERIA TO PERFORM URINE CULTURE. URINE   CULTURE WILL BE ADDED.        WBC,UR  >50 /HPF <6 A Final           EPITHELIAL CELL  FEW  NEGATIVE A Final           BACTERIA,UR  MODERATE  NEGATIVE A Final           MUCOUS  PRESENT  ABSENT A Final           WBC CLUMPS  PRESENT  ABSENT A Final           UA TO CX TRIGGER  CULTURE INDICATED    Final       CULTURE, URINE  .    Final                          (Final)                      Source: URINE                      70-99,000 CFU/mL ESCHERICHIA COLI                      60-70,000 CFU/mL MIXED SKIN JOANTHAN, NO SENS DONE                      Sensitivity                            E. COLI          AMIKACIN            S <=16                AMP/SULBACTAM       R >16/8               AMPICILLIN          R >16                 BACTRIM (TRIMETH/S  R  >2/38               CEFAZOLIN           S 4                   CEFTRIAXONE         S <=1                 CIPROFLOXICIN       R >2                  GENTAMICIN          R >8                  LEVOFLOXACIN        R >4                  NITROFURANTOIN      S <=32                PIPERACILLIN/TAZOB  S <=16                TETRACYCLINE        R >8                  TOBRAMYCIN          S <=4  Assessment/Plan   Problem List Items Addressed This Visit       Neurogenic bladder     Mirabegron ER 50 mg po daily.            Benign essential tremor     Primidone 50 mg 5 tablets po hs. Primidone 50 mg 4 tablets po day            Acute cystitis without hematuria - Primary     Macrobid 100 mg po bid x 7 days. Probiotic po daily.         Generalized anxiety disorder     Hydroxyzine 25 mg po q 8 hours prn.            Time:    Jad Milan PA-C       Electronically Signed By: Jad Milan PA-C   10/27/24  3:45 PM

## 2024-10-27 VITALS
RESPIRATION RATE: 18 BRPM | DIASTOLIC BLOOD PRESSURE: 68 MMHG | OXYGEN SATURATION: 95 % | BODY MASS INDEX: 30.82 KG/M2 | HEART RATE: 71 BPM | WEIGHT: 157 LBS | HEIGHT: 60 IN | SYSTOLIC BLOOD PRESSURE: 129 MMHG | TEMPERATURE: 98.5 F

## 2024-10-27 ASSESSMENT — ENCOUNTER SYMPTOMS
CHILLS: 0
FLANK PAIN: 0
HEMATURIA: 0

## 2024-10-27 NOTE — PROGRESS NOTES
10/24/2024  Name: Maria Luz Martinez  YOB: 1935    Chief complaint: UTI    HPI: Patient was exhibiting signs of increased confusion two days ago prompting evaluation for UTI. Family requesting UA/CS. Patient has underlying dementia and become acutely confused when UTI is present. Review of lab work suggestive for E coli UTI. She was placed on appropriate antibiotics.    UTI   This is a recurrent problem. The current episode started in the past 7 days. The problem has been gradually improving. The patient is experiencing no pain. There has been no fever. Pertinent negatives include no chills, discharge, flank pain or hematuria. She has tried antibiotics for the symptoms. The treatment provided moderate relief. Her past medical history is significant for recurrent UTIs.     Review of systems:   ROS negative except were noted in HPI.    Code Status: DNR-CC    /68   Pulse 71   Temp 36.9 °C (98.5 °F)   Resp 18   Ht 1.524 m (5')   Wt 71.2 kg (157 lb)   SpO2 95%   BMI 30.66 kg/m²      Physical Exam  Constitutional:       General: She is not in acute distress.  HENT:      Head: Normocephalic.      Nose: Nose normal. No congestion.      Mouth/Throat:      Mouth: Mucous membranes are moist.   Eyes:      Comments: L eye enucleation    Cardiovascular:      Rate and Rhythm: Normal rate and regular rhythm.      Pulses: Normal pulses.   Pulmonary:      Effort: Pulmonary effort is normal.      Breath sounds: Normal breath sounds.   Abdominal:      General: Bowel sounds are normal.      Palpations: Abdomen is soft.      Tenderness: There is no abdominal tenderness. There is no guarding.   Genitourinary:     Comments: Voiding   Musculoskeletal:         General: Normal range of motion.      Cervical back: Normal range of motion.   Lymphadenopathy:      Cervical: No cervical adenopathy.   Skin:     General: Skin is warm and dry.      Capillary Refill: Capillary refill takes less than 2 seconds.   Neurological:       General: No focal deficit present.      Mental Status: She is alert and oriented to person, place, and time.   Psychiatric:         Mood and Affect: Mood normal.         Behavior: Behavior normal.     Medications reviewed during visit at facility.  Synthroid 75 mcg po daily  Preser Vision AREDS one capsule po daily  Tylenol 650 mg po q 4 hours prn  Mirabegron ER 50 mg po daily  Metformin 500 mg po bid  Miralax 17 grams po daily  Biotin 1000 mcg po daily  MOM 30 ml po daily prn  Simvastatin 40 mg po daily  Gabapentin 100 mg po daily  Cymbalta 30 mg po daily  Oxycodone 7.5/325 mg po q 6 hours prn  Aspirin 81 mg po daily.  Vitamin D 3 2000 international units po daily  Primidone 50 mg 5 tablets po hs  Primidone 50 mg 4 tablets po daily  Ropinirole 3 mg po daily  Trazodone 25 mg po q hs  Mirtazapine 7.5 mg po daily  CeraVe Diabetics Dry Skin External Cream apply to legs and arms bid  Clobetasol Propionate Cream 0.05 % apply to legs and arms bid  Hydroxyzine 25 mg po q 8 hours prn.  Ipratropium-Albuterol Solution 0.5-2.5 (3) MG/3ML unit dose q 6 hours  Macrobid 100 mg po q 12 hours x 7 days.  Labs reviewed at facility:    Laboratory: 10/24/2024 12:02 UA W/CULTURE IF INDICATED / CULTURE, URINE  Latest Version (more available)  Reviewed by steffanie on 10/24/2024 17:56  Resident Information  Resident: Maria Luz Rodgers ([id number])  Admit Date: 2021  Admitting Provider:   Attending Provider:   Copy to List:   Report Information  Collection Date: 10/22/2024 00:00  Received Date: 10/22/2024 09:10  Reported Date: 10/24/2024 12:02  Ord. Provider: Jerry Torres  Source [place]: [id number]  Lab Information  Status: Completed  Flag:    Reporting Lab:   [facility]  Order #:   [id number]  Vendor Order #:   [id number]  Category:   Chemistry, Unknown Category  Order Notes  Result for:  MARIA LUZ RODGERS  ( 1935, F)         Results   Show All Details Result Units Ref. Range Flag Status   UA W/CULTURE IF INDICATED              Grading for Epith Cells,Bact,Cast                      NEGATIVE  = 0  - 2                        FEW       = 3  - 10                       MODERATE  = 11 - 30                       MANY      = 31 - 60                       TNTC      = >61                                                             Criteria for a Urine Culture to be reflexed are:                      WBC >5/hpf                      RBC >5/hpf                   NITRITES =POSITIVE       COLOR  YELLOW  YELLOW  Final           CLARITY  TUBID  CLEAR A Final           GLUCOSE,UR  NORMAL  NEGATIVE A Final           BILIRUBIN,UR  NEGATIVE  NEGATIVE  Final           KETONES,UR  NEGATIVE  NEGATIVE  Final           SPECIFIC GRAVITY  1.025  1.001-1.030  Final           BLOOD,UR  NEGATIVE  NEGATIVE  Final           PH, URINE  5.5  5.0-8.5  Final           PROTEIN,UR  TRACE  NEGATIVE A Final           UROBILINOGEN,UR  NEGATIVE  NEGATIVE  Final           NITRITE,UR  POSITIVE  NEGATIVE A Final           LEUKOCYTES,UR  4+  NEGATIVE A Final           RBC,UR  6-20 /HPF <6 A Final      URINALYSIS RESULTS MEET CRITERIA TO PERFORM URINE CULTURE. URINE   CULTURE WILL BE ADDED.        WBC,UR  >50 /HPF <6 A Final           EPITHELIAL CELL  FEW  NEGATIVE A Final           BACTERIA,UR  MODERATE  NEGATIVE A Final           MUCOUS  PRESENT  ABSENT A Final           WBC CLUMPS  PRESENT  ABSENT A Final           UA TO CX TRIGGER  CULTURE INDICATED    Final       CULTURE, URINE  .    Final                          (Final)                      Source: URINE                      70-99,000 CFU/mL ESCHERICHIA COLI                      60-70,000 CFU/mL MIXED SKIN JONATHAN, NO SENS DONE                      Sensitivity                            E. COLI          AMIKACIN            S <=16                AMP/SULBACTAM       R >16/8               AMPICILLIN          R >16                 BACTRIM (TRIMETH/S  R >2/38               CEFAZOLIN           S 4                   CEFTRIAXONE         S  <=1                 CIPROFLOXICIN       R >2                  GENTAMICIN          R >8                  LEVOFLOXACIN        R >4                  NITROFURANTOIN      S <=32                PIPERACILLIN/TAZOB  S <=16                TETRACYCLINE        R >8                  TOBRAMYCIN          S <=4  Assessment/Plan    Problem List Items Addressed This Visit       Neurogenic bladder     Mirabegron ER 50 mg po daily.            Benign essential tremor     Primidone 50 mg 5 tablets po hs. Primidone 50 mg 4 tablets po day            Acute cystitis without hematuria - Primary     Macrobid 100 mg po bid x 7 days. Probiotic po daily.         Generalized anxiety disorder     Hydroxyzine 25 mg po q 8 hours prn.            Time:    Jad Milan PA-C

## 2024-11-21 ENCOUNTER — NURSING HOME VISIT (OUTPATIENT)
Dept: POST ACUTE CARE | Facility: EXTERNAL LOCATION | Age: 89
End: 2024-11-21
Payer: MEDICARE

## 2024-11-21 DIAGNOSIS — E11.40 TYPE 2 DIABETES MELLITUS WITH DIABETIC NEUROPATHY, UNSPECIFIED WHETHER LONG TERM INSULIN USE (MULTI): ICD-10-CM

## 2024-11-21 DIAGNOSIS — F01.518 VASCULAR DEMENTIA WITH BEHAVIORAL DISTURBANCE: ICD-10-CM

## 2024-11-21 DIAGNOSIS — R21 RASH: Primary | ICD-10-CM

## 2024-11-21 PROCEDURE — 99308 SBSQ NF CARE LOW MDM 20: CPT | Performed by: STUDENT IN AN ORGANIZED HEALTH CARE EDUCATION/TRAINING PROGRAM

## 2024-11-21 NOTE — LETTER
Patient: Maria Luz Martinez  : 1935    Encounter Date: 2024      Patient seen at [facility].      Chief Complaint: Patient seen as follow-up.    HPI:  This is a 87 yo F who is a LTC resident.      Patient has no new complaints at this time. Seen at bedside. Feeling fine. Denies pain. Not sure about her skin rash. Patient is participating in therapy sessions.     ROS:  12-pt ROS was reviewed with patient and was negative, unless otherwise noted in HPI.     PMHx; SocHx; FamHx; Allergies; Medications: all reviewed, see CNP/PA notes, EMR, and records for further details.     LABS: available labs were reviewed.     VITALS: vitals reviewed, see EMR for further details.     PHYSICAL EXAM:  GEN: calm, no acute distress  HEENT: PER, EOMI, MMM. Left eye enucleation in the past.  NECK: supple  CV: S1, S2, RRR  PULM: unlabored breathing, CTAB  ABD: soft, NT  Neuro: no new gross focal deficits  PSYCH: appropriate affect  SKIN: petechial rash upper and lower extremities     ASSESSMENT:  Petechial rash, recent visit w/ dermatologist  Vascular dementia w/ behavioral disturbance  Restless leg syndrome  Benign essential tremor  Diabetes type II  Chronic pain  Neuropathy        PLAN:  PT/OT/ST as indicated.  Prior records and hospital notes reviewed.  I agree with plan of care as detailed in CNP/PA note.  Fall precautions.  I discussed case with nursing staff.  Advised close PCP fu as outpatient as indicated.     This is a preliminary note, please await attending attestation for a finalized plan.    Sheng Galvan MD  Internal Medicine PGY3  Please message me with any questions    Trainee role: Resident    I saw and evaluated the patient. I personally obtained the key and critical portions of the history and physical exam or was physically present for key and critical portions performed by the trainee. I reviewed the trainee's documentation and discussed the patient with the trainee. I agree with the trainee's  medical decision making as documented on the trainee's notes.    Jerry Torres M.D.              Electronically Signed By: Jerry Torres MD   11/25/24  4:05 PM

## 2024-11-22 NOTE — PROGRESS NOTES
Patient seen at [facility].      Chief Complaint: Patient seen as follow-up.    HPI:  This is a 87 yo F who is a LTC resident.      Patient has no new complaints at this time. Seen at bedside. Feeling fine. Denies pain. Not sure about her skin rash. Patient is participating in therapy sessions.     ROS:  12-pt ROS was reviewed with patient and was negative, unless otherwise noted in HPI.     PMHx; SocHx; FamHx; Allergies; Medications: all reviewed, see CNP/PA notes, EMR, and records for further details.     LABS: available labs were reviewed.     VITALS: vitals reviewed, see EMR for further details.     PHYSICAL EXAM:  GEN: calm, no acute distress  HEENT: PER, EOMI, MMM. Left eye enucleation in the past.  NECK: supple  CV: S1, S2, RRR  PULM: unlabored breathing, CTAB  ABD: soft, NT  Neuro: no new gross focal deficits  PSYCH: appropriate affect  SKIN: petechial rash upper and lower extremities     ASSESSMENT:  Petechial rash, recent visit w/ dermatologist  Vascular dementia w/ behavioral disturbance  Restless leg syndrome  Benign essential tremor  Diabetes type II  Chronic pain  Neuropathy        PLAN:  PT/OT/ST as indicated.  Prior records and hospital notes reviewed.  I agree with plan of care as detailed in CNP/PA note.  Fall precautions.  I discussed case with nursing staff.  Advised close PCP fu as outpatient as indicated.     This is a preliminary note, please await attending attestation for a finalized plan.    Sheng Galvan MD  Internal Medicine PGY3  Please message me with any questions    Trainee role: Resident    I saw and evaluated the patient. I personally obtained the key and critical portions of the history and physical exam or was physically present for key and critical portions performed by the trainee. I reviewed the trainee's documentation and discussed the patient with the trainee. I agree with the trainee's medical decision making as documented on the trainee's notes.    Jerry  STACY Torres M.D.

## 2024-11-26 ENCOUNTER — APPOINTMENT (OUTPATIENT)
Dept: NEUROLOGY | Facility: CLINIC | Age: 89
End: 2024-11-26
Payer: MEDICARE

## 2024-11-26 VITALS
TEMPERATURE: 97.2 F | BODY MASS INDEX: 29.64 KG/M2 | WEIGHT: 157 LBS | HEART RATE: 83 BPM | RESPIRATION RATE: 18 BRPM | HEIGHT: 61 IN | SYSTOLIC BLOOD PRESSURE: 136 MMHG | DIASTOLIC BLOOD PRESSURE: 61 MMHG

## 2024-11-26 DIAGNOSIS — G62.9 NEUROPATHY: ICD-10-CM

## 2024-11-26 DIAGNOSIS — G20.A1 PARKINSON'S DISEASE, UNSPECIFIED WHETHER DYSKINESIA PRESENT, UNSPECIFIED WHETHER MANIFESTATIONS FLUCTUATE: ICD-10-CM

## 2024-11-26 DIAGNOSIS — G25.3 MYOCLONUS: ICD-10-CM

## 2024-11-26 DIAGNOSIS — G25.81 RESTLESS LEG SYNDROME: Primary | ICD-10-CM

## 2024-11-26 PROCEDURE — 99214 OFFICE O/P EST MOD 30 MIN: CPT | Performed by: PSYCHIATRY & NEUROLOGY

## 2024-11-26 PROCEDURE — 1126F AMNT PAIN NOTED NONE PRSNT: CPT | Performed by: PSYCHIATRY & NEUROLOGY

## 2024-11-26 PROCEDURE — 1157F ADVNC CARE PLAN IN RCRD: CPT | Performed by: PSYCHIATRY & NEUROLOGY

## 2024-11-26 PROCEDURE — G2211 COMPLEX E/M VISIT ADD ON: HCPCS | Performed by: PSYCHIATRY & NEUROLOGY

## 2024-11-26 PROCEDURE — 1036F TOBACCO NON-USER: CPT | Performed by: PSYCHIATRY & NEUROLOGY

## 2024-11-26 PROCEDURE — 1159F MED LIST DOCD IN RCRD: CPT | Performed by: PSYCHIATRY & NEUROLOGY

## 2024-11-26 RX ORDER — HYDROXYZINE PAMOATE 25 MG/1
CAPSULE ORAL 3 TIMES DAILY PRN
COMMUNITY
Start: 2024-04-21

## 2024-11-26 RX ORDER — HYDROXYZINE HYDROCHLORIDE 25 MG/1
TABLET, FILM COATED ORAL 3 TIMES DAILY
COMMUNITY
Start: 2024-10-01 | End: 2024-11-26 | Stop reason: WASHOUT

## 2024-11-26 RX ORDER — ROPINIROLE 2 MG/1
2 TABLET, FILM COATED ORAL NIGHTLY
Qty: 90 TABLET | Refills: 3 | Status: SHIPPED | OUTPATIENT
Start: 2024-11-26

## 2024-11-26 RX ORDER — CARBIDOPA AND LEVODOPA 25; 100 MG/1; MG/1
TABLET ORAL
Qty: 270 TABLET | Refills: 3 | Status: SHIPPED | OUTPATIENT
Start: 2024-11-26 | End: 2024-11-27 | Stop reason: SDUPTHER

## 2024-11-26 ASSESSMENT — ENCOUNTER SYMPTOMS
LOSS OF SENSATION IN FEET: 0
DEPRESSION: 0
OCCASIONAL FEELINGS OF UNSTEADINESS: 0

## 2024-11-26 ASSESSMENT — PAIN SCALES - GENERAL: PAINLEVEL_OUTOF10: 0-NO PAIN

## 2024-11-26 NOTE — PROGRESS NOTES
rSubjective     Maria Luz Martinez is a 89 y.o. year old female here for a follow up for tremor / myoclonus, neuropathy, RLS follow up.     HPI  Accompanied by her daughter today and daughter in law who provided history.   She is more lethargic/ low energy. PCP is Dr. Barron Torres.   She had her Left eye removed this past year due to infection.    Last time we recommended to increase to Primidone dose 200mg in morning/ 250mg at night.  She feels her tremors are worse.  Eating is not easy with tremors.  She cannot eat much due to severe tremors. She takes 100mg primidone in morning and 250mg at night.     RLS - on Ropinirole 3mg ( was lowered recently from 4mg due to sleepiness) and Lyrica. - RLS symptoms are stable.  Lives at [facility].   Review of Systems    Patient Active Problem List   Diagnosis    Blindness of left eye    Cognitive dysfunction    DM2 (diabetes mellitus, type 2) (Multi)    Functional movement disorder    Fibromyalgia    Hypothyroidism    Neurogenic bladder    Poor balance    Restless leg syndrome    CVA (cerebral vascular accident) (Multi)    Vitamin D deficiency    Chronic UTI    Benign essential tremor    Depression    Vascular dementia with behavioral disturbance    Neuropathy    Mild protein-calorie malnutrition (Multi)    Hypercholesteremia    Anemia    Aortic regurgitation    Abnormal EKG    Major depression, recurrent, chronic (CMS-HCC)    Acute cough    Acute cystitis without hematuria    Generalized anxiety disorder    Rash     Past Medical History:   Diagnosis Date    Abnormal EKG 10/17/2023    During anesthesia at [facility]    Anemia 10/17/2023    Aortic regurgitation 10/17/2023    Mild to mod in 2017    CVA (cerebral vascular accident) (Multi) 03/31/2023    2018    Diverticulitis of intestine, part unspecified, without perforation or abscess without bleeding     Diverticulitis    DM2 (diabetes mellitus, type 2) (Multi) 03/31/2023    Fracture, femur (Multi) 03/31/2023    Hypercholesteremia  10/17/2023    Hypothyroidism 03/31/2023    Personal history of other diseases of the musculoskeletal system and connective tissue     History of osteoarthritis    Personal history of other endocrine, nutritional and metabolic disease     History of hyperlipidemia    Personal history of transient ischemic attack (TIA), and cerebral infarction without residual deficits 05/03/2021    H/O: CVA (cerebrovascular accident)    Vascular dementia with behavioral disturbance (Multi) 06/29/2023     Past Surgical History:   Procedure Laterality Date    CATARACT EXTRACTION  07/17/2014    Cataract Surgery    CHOLECYSTECTOMY  07/17/2014    Cholecystectomy    ENUCLEATION Left     HYSTERECTOMY  07/21/2014    Hysterectomy    TOTAL KNEE ARTHROPLASTY  07/17/2014    Knee Replacement     Social History     Tobacco Use    Smoking status: Never    Smokeless tobacco: Never   Substance Use Topics    Alcohol use: Never     family history includes Breast cancer in her sister; COPD in her mother; Ulcers in her sister; cervix uteri cancer in her sister.    Current Outpatient Medications:     aspirin 81 mg chewable tablet, Chew 1 tablet (81 mg) once daily., Disp: , Rfl:     cholecalciferol (Vitamin D-3) 50 mcg (2,000 unit) capsule, Take 1 capsule (50 mcg) by mouth early in the morning.., Disp: , Rfl:     DULoxetine (Cymbalta) 30 mg DR capsule, Take 1 capsule (30 mg) by mouth once daily., Disp: , Rfl:     ERGOCALCIFEROL, VITAMIN D2, ORAL, Take 2,000 Units by mouth once daily., Disp: , Rfl:     gabapentin (Neurontin) 100 mg capsule, Take 1 capsule (100 mg) by mouth once daily at bedtime., Disp: , Rfl:     levoFLOXacin (Levaquin) 500 mg tablet, , Disp: , Rfl:     levothyroxine (Synthroid) 75 mcg tablet, Take 1 tablet (75 mcg) by mouth once daily., Disp: , Rfl:     metFORMIN (Glucophage) 500 mg tablet, Take 1 tablet (500 mg) by mouth 2 times a day with meals., Disp: , Rfl:     mirabegron (Myrbetriq) 50 mg tablet extended release 24 hr 24 hr tablet,  Take 1 tablet (50 mg) by mouth once daily., Disp: , Rfl:     mirtazapine (Remeron) 7.5 mg tablet, Take 1 tablet (7.5 mg) by mouth once daily at bedtime., Disp: , Rfl:     nystatin, bulk, 15 billion unit powder, Nystatin Powder Refills: 0, Disp: , Rfl:     oxyCODONE-acetaminophen (Percocet) 7.5-325 mg tablet, Take 1 tablet by mouth every 6 hours if needed (pain)., Disp: , Rfl:     primidone (Mysoline) 250 mg tablet, Take 1 tablet (250 mg) by mouth once daily at bedtime., Disp: 90 tablet, Rfl: 3    primidone (Mysoline) 50 mg tablet, Take 2 tabs in am x 7 days then increase by 0.5 tab each week until taking 4 tabs in morning and continue, Disp: 360 tablet, Rfl: 3    rOPINIRole (Requip) 3 mg tablet, Take 1 tablet (3 mg) by mouth once daily at bedtime., Disp: , Rfl:     vit A,C and E-lutein-minerals (Ocuvite) 300 mcg-200 mg-27 mg-2 mg tablet, Take 1 tablet by mouth once daily., Disp: , Rfl:     vitamins A,C,E-zinc-copper (PreserVision AREDS) 4,296 mcg-226 mg-90 mg capsule, Take 1 capsule by mouth twice a day., Disp: , Rfl:     Zocor 40 mg tablet, Take 1 tablet (40 mg) by mouth once daily., Disp: , Rfl:   Allergies   Allergen Reactions    Iodine Unknown     Constitutional: General appearance: no acute distress. Pleasant. In wheelchair.  Auscultation of Heart: Regular rate and rhythm, no murmurs, normal S1 and S2.   Carotid Arteries: Intact without any bruits   Peripheral Vascular Exam: Pulses +2 and equal in all extremities. Mild leg swelling in legs b/l   Mental status: The patient was in no distress, alert, interactive and cooperative. Affect is appropriate.   Orientation: oriented to person, oriented to place and oriented to time.   Lethargic.   Fund of knowledge: Patient displays adequate knowledge of current events, adequate fund of knowledge and fund of knowledge .   Cranial nerve II: Visual fields full to confrontation.   Cranial nerves III, IV, and VI: does not have a Left eye. Pupil on R is 3mm , reactive.  EOMs  intact on R.  No nystagmus.   Cranial Nerve V: Facial sensation intact bilaterally.   Cranial nerve VII: Normal and symmetric facial strength.   Cranial nerve VIII: Hearing is impaired.   Cranial nerves IX and X: Palate elevates symmetrically.   Cranial nerve XI: Shoulder shrug and neck rotation strength are intact.   Cranial nerve XII: Tongue midline with normal strength.   Motor:  moderate to severe bradykinesia and b/l hand tremors and rigidity in all ext.  Moves all extremities.   Deep Tendon Reflexes: left biceps 2+ , right biceps 2+, left triceps 2+, right triceps 2+, left brachioradialis 2+, right brachioradialis 2+, left patella 2+, right patella 2+, left ankle jerk 2+, right ankle jerk 2+   Plantar Reflex: Toes downgoing to plantar stimulation on the left. Toes downgoing to plantar stimulation on the right.   Sensory Exam: Normal to light touch.   Coordination: There is no limb dystaxia  Gait:  nonambulatory        Labs:  CBC:   Lab Results   Component Value Date    WBC 6.8 02/14/2022    HGB 8.0 (L) 02/14/2022    HCT 25.2 (L) 02/14/2022     02/14/2022     BMP:   Lab Results   Component Value Date     02/14/2022    K 3.9 02/14/2022     02/14/2022    CO2 32 02/14/2022    BUN 11 02/14/2022    CREATININE 0.55 02/14/2022    CALCIUM 7.4 (L) 02/14/2022    MG 1.96 02/14/2022     LFT:   Lab Results   Component Value Date    ALKPHOS 60 02/13/2022    BILITOT 0.4 02/13/2022    BILIDIR 0.1 02/13/2022    PROT 4.2 (L) 02/13/2022    ALBUMIN 2.3 (L) 02/13/2022    ALT 10 02/13/2022    AST 25 02/13/2022     Normal LFT and normal , normal kidney function testing.     Assessment/Plan   Problem List Items Addressed This Visit             ICD-10-CM    Restless leg syndrome - Primary G25.81    Neuropathy G62.9     Other Visit Diagnoses         Codes    Myoclonus     G25.3          This is a chronic neurologic condition that requires ongoing care and monitoring. This is a complex, serious condition that needs  long term care going forward. Between myself and the patient we will be changing direction of care depending on responses to treatment.   Today we discussed medication options, non medication options for management and various other symptoms that are in relation to this disease.  I will continue to be involved in the care of this patient.     Tremors and signs of parkinsonism.   Worsening confusion.  Worried about increasing Primidone due to confusion. I would recommend stopping Primidone trial of Sinemet.   Lower Ropinirole 2mg due to confusion.

## 2024-11-27 DIAGNOSIS — G20.A1 PARKINSON'S DISEASE, UNSPECIFIED WHETHER DYSKINESIA PRESENT, UNSPECIFIED WHETHER MANIFESTATIONS FLUCTUATE: ICD-10-CM

## 2024-11-27 RX ORDER — CARBIDOPA AND LEVODOPA 25; 100 MG/1; MG/1
TABLET ORAL
Qty: 270 TABLET | Refills: 3 | Status: SHIPPED | OUTPATIENT
Start: 2024-11-27

## 2024-11-27 NOTE — TELEPHONE ENCOUNTER
Patients daughter called in stating that the prescription needs printed out and faxed to Attn: Melody Marroquin at fax#[phone]. Please advise.

## 2024-12-18 ENCOUNTER — TELEPHONE (OUTPATIENT)
Dept: NEUROLOGY | Facility: CLINIC | Age: 89
End: 2024-12-18
Payer: MEDICARE

## 2024-12-18 NOTE — TELEPHONE ENCOUNTER
Since starting the Carbidopa/Levodopa hasn't been eating well for last 10 days only eating a little breakfast hardly touching lunch and not eating dinner, also isn't drinking her coffee, do you think this is from the meds or something else?

## 2025-01-02 ENCOUNTER — TELEPHONE (OUTPATIENT)
Dept: NEUROLOGY | Facility: CLINIC | Age: OVER 89
End: 2025-01-02
Payer: COMMERCIAL

## 2025-01-02 DIAGNOSIS — G25.0 BENIGN ESSENTIAL TREMOR: Primary | ICD-10-CM

## 2025-01-02 RX ORDER — PRIMIDONE 50 MG/1
TABLET ORAL
Qty: 360 TABLET | Refills: 3 | Status: SHIPPED | OUTPATIENT
Start: 2025-01-02 | End: 2025-01-06 | Stop reason: SDUPTHER

## 2025-01-02 NOTE — TELEPHONE ENCOUNTER
Patients daughter Kaylie called in stating that her moms tremors have become worse since she stopped the primodone, has became so bad that they are worse in her legs., They did not realize how bad the tremors were until she stopped taking it. Stated that she did not respond well with the Sinemet and was wondering if the primidone could be started again until there telephone visit on 1/10/25. Please advise.

## 2025-01-06 DIAGNOSIS — G25.0 BENIGN ESSENTIAL TREMOR: ICD-10-CM

## 2025-01-06 RX ORDER — PRIMIDONE 50 MG/1
TABLET ORAL
Qty: 360 TABLET | Refills: 3 | Status: SHIPPED | OUTPATIENT
Start: 2025-01-06 | End: 2025-01-07 | Stop reason: SDUPTHER

## 2025-01-07 DIAGNOSIS — G25.0 BENIGN ESSENTIAL TREMOR: ICD-10-CM

## 2025-01-07 RX ORDER — PRIMIDONE 50 MG/1
TABLET ORAL
Qty: 360 TABLET | Refills: 3 | Status: SHIPPED | OUTPATIENT
Start: 2025-01-07

## 2025-01-10 ENCOUNTER — APPOINTMENT (OUTPATIENT)
Dept: NEUROLOGY | Facility: CLINIC | Age: OVER 89
End: 2025-01-10
Payer: MEDICARE

## 2025-01-17 ENCOUNTER — APPOINTMENT (OUTPATIENT)
Dept: NEUROLOGY | Facility: CLINIC | Age: OVER 89
End: 2025-01-17
Payer: MEDICARE

## 2025-01-28 ENCOUNTER — TELEPHONE (OUTPATIENT)
Dept: NEUROLOGY | Facility: CLINIC | Age: OVER 89
End: 2025-01-28
Payer: COMMERCIAL

## 2025-01-28 NOTE — TELEPHONE ENCOUNTER
Patients daughter Kaylie called in to make you aware that her mom is doing really well at the 100mg primidone. She is eating and her tremors are stable. Her question to you is if they can stop at the 100mg and revisit it at her appointment with you on 2/14/24 as the primidone does make her drowsy. She feels that since her mom seems to be doing well she doesn't want to increase it in case it my make her too drowsy. If so can you write a new prescription order just for the 100mg increase so I can fax it over to Attn; Vivian Marroquin at [facility] fax#[phone]. Thanks

## 2025-01-29 DIAGNOSIS — G25.0 BENIGN ESSENTIAL TREMOR: ICD-10-CM

## 2025-01-29 RX ORDER — PRIMIDONE 50 MG/1
TABLET ORAL
Qty: 180 TABLET | Refills: 3 | Status: SHIPPED | OUTPATIENT
Start: 2025-01-29

## 2025-01-29 RX ORDER — PRIMIDONE 50 MG/1
TABLET ORAL
Qty: 80 TABLET | Refills: 3 | Status: SHIPPED | OUTPATIENT
Start: 2025-01-29 | End: 2025-01-29

## 2025-02-14 ENCOUNTER — APPOINTMENT (OUTPATIENT)
Dept: NEUROLOGY | Facility: CLINIC | Age: OVER 89
End: 2025-02-14
Payer: MEDICARE

## 2025-02-14 DIAGNOSIS — G25.81 RESTLESS LEG SYNDROME: ICD-10-CM

## 2025-02-14 DIAGNOSIS — G25.0 BENIGN ESSENTIAL TREMOR: Primary | ICD-10-CM

## 2025-02-14 DIAGNOSIS — G20.A1 PARKINSON'S DISEASE, UNSPECIFIED WHETHER DYSKINESIA PRESENT, UNSPECIFIED WHETHER MANIFESTATIONS FLUCTUATE: ICD-10-CM

## 2025-02-14 RX ORDER — MIRTAZAPINE 7.5 MG/1
7.5 TABLET, FILM COATED ORAL NIGHTLY
COMMUNITY

## 2025-02-14 RX ORDER — TRAZODONE HYDROCHLORIDE 50 MG/1
0.5 TABLET ORAL NIGHTLY
COMMUNITY
Start: 2025-02-10

## 2025-02-14 RX ORDER — GABAPENTIN 100 MG/1
CAPSULE ORAL
Qty: 90 CAPSULE | Refills: 3 | Status: SHIPPED | OUTPATIENT
Start: 2025-02-14

## 2025-02-14 RX ORDER — ROPINIROLE 0.5 MG/1
TABLET, FILM COATED ORAL
Qty: 90 TABLET | Refills: 2 | Status: SHIPPED | OUTPATIENT
Start: 2025-02-14

## 2025-02-14 ASSESSMENT — ENCOUNTER SYMPTOMS: TREMORS: 1

## 2025-02-14 NOTE — PATIENT INSTRUCTIONS
"It was a pleasure seeing you today.    Fax [facility] prescription to nursing home.   Continue Ropinirole 2mg daily.  Start Ropinirole 0.5mg as needed RLS at night to add to 2mg.     Please make a follow up appointment in 5-6 months.  You may also schedule a phone or virtual visit sooner on a Friday morning with me as needed before the next clinic appointment.     For any urgent issues or needing to speak to a medical assistant please call [phone], option 6 during our office hours Monday-Friday 8am-4pm, and leave a voicemail with your concern.  My office will try to reach back you as soon as possible within 24 (business) hours.  If you have an emergency please call 911 or visit a local urgent care or nearest emergency room.      Please understand that [employer] is a useful communication tool for simple \"normal\" results or a refill request but I would not recommend using this tool for emergent or urgent issues or for conversations with me.  I am happy to ask my staff to rearrange a follow up visit or a virtual visit sooner than requested if appropriate for your care.    "

## 2025-02-14 NOTE — PROGRESS NOTES
Subjective     Maria Luz Martinez is a 89 y.o. year old female here for a virtual visit for tremor / myoclonus, RLS follow up.- phone visit.     Tremor    Accompanied by her daughter today.  She was really tired and confused and not eating while on Sinemet - also did not help her tremor.  RLS is worse at night.  She wants to move then, feet hurt at night. She takes Ropinirole 2mg and gabapentin 100mg night.  She then stopped Sinemet but the got sick with COVID and had bronchitis.  She is now on Primidone 100mg at bedtime and doing much better.    RLS - on Ropinirole 2mg ( was lowered from 3mg) and    Review of Systems    Patient Active Problem List   Diagnosis    Blindness of left eye    Cognitive dysfunction    DM2 (diabetes mellitus, type 2) (Multi)    Functional movement disorder    Fibromyalgia    Hypothyroidism    Neurogenic bladder    Poor balance    Restless leg syndrome    CVA (cerebral vascular accident) (Multi)    Vitamin D deficiency    Chronic UTI    Benign essential tremor    Depression    Vascular dementia with behavioral disturbance    Neuropathy    Mild protein-calorie malnutrition (Multi)    Hypercholesteremia    Anemia    Aortic regurgitation    Abnormal EKG    Major depression, recurrent, chronic (CMS-HCC)    Acute cough    Acute cystitis without hematuria    Generalized anxiety disorder    Rash     Past Medical History:   Diagnosis Date    Abnormal EKG 10/17/2023    During anesthesia at [facility]    Anemia 10/17/2023    Aortic regurgitation 10/17/2023    Mild to mod in 2017    CVA (cerebral vascular accident) (Multi) 03/31/2023    2018    Diverticulitis of intestine, part unspecified, without perforation or abscess without bleeding     Diverticulitis    DM2 (diabetes mellitus, type 2) (Multi) 03/31/2023    Fracture, femur (Multi) 03/31/2023    Hypercholesteremia 10/17/2023    Hypothyroidism 03/31/2023    Personal history of other diseases of the musculoskeletal system and connective tissue     History  of osteoarthritis    Personal history of other endocrine, nutritional and metabolic disease     History of hyperlipidemia    Personal history of transient ischemic attack (TIA), and cerebral infarction without residual deficits 05/03/2021    H/O: CVA (cerebrovascular accident)    Vascular dementia with behavioral disturbance 06/29/2023     Past Surgical History:   Procedure Laterality Date    CATARACT EXTRACTION  07/17/2014    Cataract Surgery    CHOLECYSTECTOMY  07/17/2014    Cholecystectomy    ENUCLEATION Left     HYSTERECTOMY  07/21/2014    Hysterectomy    TOTAL KNEE ARTHROPLASTY  07/17/2014    Knee Replacement     Social History     Tobacco Use    Smoking status: Never    Smokeless tobacco: Never   Substance Use Topics    Alcohol use: Never     family history includes Breast cancer in her sister; COPD in her mother; Ulcers in her sister; cervix uteri cancer in her sister.    Current Outpatient Medications:     aspirin 81 mg chewable tablet, Chew 1 tablet (81 mg) once daily., Disp: , Rfl:     cholecalciferol (Vitamin D-3) 50 mcg (2,000 unit) capsule, Take 1 capsule (50 mcg) by mouth early in the morning.., Disp: , Rfl:     DULoxetine (Cymbalta) 30 mg DR capsule, Take 1 capsule (30 mg) by mouth once daily., Disp: , Rfl:     gabapentin (Neurontin) 100 mg capsule, Take 1 capsule (100 mg) by mouth once daily at bedtime., Disp: , Rfl:     hydrOXYzine pamoate (Vistaril) 25 mg capsule, 3 times a day as needed., Disp: , Rfl:     levothyroxine (Synthroid) 75 mcg tablet, Take 1 tablet (75 mcg) by mouth once daily., Disp: , Rfl:     mirtazapine (Remeron) 7.5 mg tablet, Take 1 tablet (7.5 mg) by mouth once daily at bedtime., Disp: , Rfl:     oxyCODONE-acetaminophen (Percocet) 7.5-325 mg tablet, Take 1 tablet by mouth every 6 hours if needed (pain)., Disp: , Rfl:     primidone (Mysoline) 50 mg tablet, Take 2 tabs at bedtime, Disp: 180 tablet, Rfl: 3    rOPINIRole (Requip) 2 mg tablet, Take 1 tablet (2 mg) by mouth once daily  at bedtime., Disp: 90 tablet, Rfl: 3    traZODone (Desyrel) 50 mg tablet, 0.5 tablets (25 mg) once daily at bedtime., Disp: , Rfl:     vit A,C and E-lutein-minerals (Ocuvite) 300 mcg-200 mg-27 mg-2 mg tablet, Take 1 tablet by mouth once daily., Disp: , Rfl:     vitamins A,C,E-zinc-copper (PreserVision AREDS) 4,296 mcg-226 mg-90 mg capsule, Take 1 capsule by mouth twice a day., Disp: , Rfl:     Zocor 40 mg tablet, Take 1 tablet (40 mg) by mouth once daily., Disp: , Rfl:     ERGOCALCIFEROL, VITAMIN D2, ORAL, Take 2,000 Units by mouth once daily. (Patient not taking: Reported on 2/14/2025), Disp: , Rfl:     metFORMIN (Glucophage) 500 mg tablet, Take 1 tablet (500 mg) by mouth 2 times daily (morning and late afternoon). (Patient not taking: Reported on 2/14/2025), Disp: , Rfl:     mirabegron (Myrbetriq) 50 mg tablet extended release 24 hr 24 hr tablet, Take 1 tablet (50 mg) by mouth once daily. (Patient not taking: Reported on 2/14/2025), Disp: , Rfl:   Allergies   Allergen Reactions    Iodine Unknown       Neurological Exam/Physical Exam:  Talked to caregiver, daughter Kaylie on phone .  Patient was on as well- fluent.  Appropriate. Conversational.        Labs:  CBC:   Lab Results   Component Value Date    WBC 5.9 12/30/2024    HGB 13.1 12/30/2024    HCT 40.7 12/30/2024     12/30/2024     BMP:   Lab Results   Component Value Date     (L) 12/30/2024    K 5.0 12/30/2024    CL 96 (L) 12/30/2024    CO2 30 12/30/2024    BUN 14 12/30/2024    CREATININE 0.59 12/30/2024    CALCIUM 8.8 12/30/2024    MG 1.96 02/14/2022     LFT:   Lab Results   Component Value Date    ALKPHOS 60 02/13/2022    BILITOT 0.4 02/13/2022    BILIDIR 0.1 02/13/2022    PROT 4.2 (L) 02/13/2022    ALBUMIN 2.3 (L) 02/13/2022    ALT 10 02/13/2022    AST 25 02/13/2022         Assessment/Plan   Problem List Items Addressed This Visit             ICD-10-CM    Benign essential tremor - Primary G25.0       Plan to leave Primidone 100mg at bedtime for  tremors.  Consider increase if needed.  Take extra Ropinirole 0.5mg at night for RLS.   Try  gabapentin increase 200mg at night.     Total time with patient encounter: 12 min

## 2025-02-19 ENCOUNTER — TELEPHONE (OUTPATIENT)
Dept: NEUROLOGY | Facility: CLINIC | Age: OVER 89
End: 2025-02-19
Payer: COMMERCIAL

## 2025-02-19 DIAGNOSIS — G25.81 RESTLESS LEG SYNDROME: ICD-10-CM

## 2025-02-19 RX ORDER — GABAPENTIN 100 MG/1
CAPSULE ORAL
Qty: 90 CAPSULE | Refills: 3 | Status: SHIPPED | OUTPATIENT
Start: 2025-02-19

## 2025-02-19 NOTE — TELEPHONE ENCOUNTER
Are you able to print out the gabapentin prescription so I can fax over to Attn: Melody Marroquin fax #[phone]. Thanks